# Patient Record
Sex: FEMALE | Race: WHITE | NOT HISPANIC OR LATINO | Employment: OTHER | URBAN - METROPOLITAN AREA
[De-identification: names, ages, dates, MRNs, and addresses within clinical notes are randomized per-mention and may not be internally consistent; named-entity substitution may affect disease eponyms.]

---

## 2023-04-02 PROBLEM — R73.09 ELEVATED GLUCOSE: Status: ACTIVE | Noted: 2023-04-02

## 2023-04-02 PROBLEM — R06.83 SNORING: Status: ACTIVE | Noted: 2023-04-02

## 2023-04-02 PROBLEM — N39.0 RECURRENT UTI: Status: ACTIVE | Noted: 2023-04-02

## 2023-04-02 PROBLEM — E66.01 OBESITY, CLASS III, BMI 40-49.9 (MORBID OBESITY) (MULTI): Status: ACTIVE | Noted: 2023-04-02

## 2023-04-02 PROBLEM — N18.31 CHRONIC KIDNEY DISEASE, STAGE 3A (MULTI): Status: ACTIVE | Noted: 2023-04-02

## 2023-04-02 PROBLEM — J30.9 ALLERGIC RHINITIS: Status: ACTIVE | Noted: 2023-04-02

## 2023-04-02 PROBLEM — R91.8 LUNG MASS: Status: ACTIVE | Noted: 2023-04-02

## 2023-04-02 PROBLEM — K22.5 ZENKER'S DIVERTICULUM: Status: ACTIVE | Noted: 2023-04-02

## 2023-04-02 PROBLEM — R53.81 PHYSICAL DECONDITIONING: Status: ACTIVE | Noted: 2023-04-02

## 2023-04-02 PROBLEM — I44.7 LEFT BUNDLE BRANCH BLOCK (LBBB): Status: ACTIVE | Noted: 2023-04-02

## 2023-04-02 PROBLEM — R53.81 MALAISE AND FATIGUE: Status: ACTIVE | Noted: 2023-04-02

## 2023-04-02 PROBLEM — E66.01 CLASS 3 SEVERE OBESITY DUE TO EXCESS CALORIES WITH BODY MASS INDEX (BMI) OF 40.0 TO 44.9 IN ADULT (MULTI): Status: ACTIVE | Noted: 2023-04-02

## 2023-04-02 PROBLEM — I51.7 CARDIOMEGALY: Status: ACTIVE | Noted: 2023-04-02

## 2023-04-02 PROBLEM — L65.9 HAIR LOSS: Status: ACTIVE | Noted: 2023-04-02

## 2023-04-02 PROBLEM — T21.14XA: Status: ACTIVE | Noted: 2023-04-02

## 2023-04-02 PROBLEM — R74.01 ELEVATED TRANSAMINASE LEVEL: Status: ACTIVE | Noted: 2023-04-02

## 2023-04-02 PROBLEM — R09.1 PLEURISY: Status: ACTIVE | Noted: 2023-04-02

## 2023-04-02 PROBLEM — M16.12 ARTHRITIS OF LEFT HIP: Status: ACTIVE | Noted: 2023-04-02

## 2023-04-02 PROBLEM — R53.83 FATIGUE: Status: ACTIVE | Noted: 2023-04-02

## 2023-04-02 PROBLEM — H91.93 HEARING LOSS OF BOTH EARS: Status: ACTIVE | Noted: 2023-04-02

## 2023-04-02 PROBLEM — I27.20 PULMONARY HYPERTENSION (MULTI): Status: ACTIVE | Noted: 2023-04-02

## 2023-04-02 PROBLEM — R30.0 DYSURIA: Status: ACTIVE | Noted: 2023-04-02

## 2023-04-02 PROBLEM — E73.9 LACTOSE INTOLERANCE: Status: ACTIVE | Noted: 2023-04-02

## 2023-04-02 PROBLEM — H11.32 SUBCONJUNCTIVAL HEMORRHAGE OF LEFT EYE: Status: ACTIVE | Noted: 2023-04-02

## 2023-04-02 PROBLEM — E78.5 HYPERLIPIDEMIA: Status: ACTIVE | Noted: 2023-04-02

## 2023-04-02 PROBLEM — N18.9 CKD (CHRONIC KIDNEY DISEASE): Status: ACTIVE | Noted: 2023-04-02

## 2023-04-02 PROBLEM — E03.9 HYPOTHYROIDISM (ACQUIRED): Status: ACTIVE | Noted: 2023-04-02

## 2023-04-02 PROBLEM — M79.642 HAND PAIN, LEFT: Status: ACTIVE | Noted: 2023-04-02

## 2023-04-02 PROBLEM — E55.9 VITAMIN D DEFICIENCY: Status: ACTIVE | Noted: 2023-04-02

## 2023-04-02 PROBLEM — N39.41 URGE INCONTINENCE OF URINE: Status: ACTIVE | Noted: 2023-04-02

## 2023-04-02 PROBLEM — I10 BENIGN ESSENTIAL HYPERTENSION: Status: ACTIVE | Noted: 2023-04-02

## 2023-04-02 PROBLEM — R13.12 OROPHARYNGEAL DYSPHAGIA: Status: ACTIVE | Noted: 2023-04-02

## 2023-04-02 PROBLEM — R06.02 SOB (SHORTNESS OF BREATH) ON EXERTION: Status: ACTIVE | Noted: 2023-04-02

## 2023-04-02 PROBLEM — S23.9XXA THORACIC SPRAIN: Status: ACTIVE | Noted: 2023-04-02

## 2023-04-02 PROBLEM — G47.33 OSA (OBSTRUCTIVE SLEEP APNEA): Status: ACTIVE | Noted: 2023-04-02

## 2023-04-02 PROBLEM — I49.9 ARRHYTHMIA: Status: ACTIVE | Noted: 2023-04-02

## 2023-04-02 PROBLEM — R53.83 MALAISE AND FATIGUE: Status: ACTIVE | Noted: 2023-04-02

## 2023-04-02 PROBLEM — R04.0 EPISTAXIS: Status: ACTIVE | Noted: 2023-04-02

## 2023-04-02 PROBLEM — R26.89 IMPAIRMENT OF BALANCE: Status: ACTIVE | Noted: 2023-04-02

## 2023-04-02 PROBLEM — I42.9 CARDIOMYOPATHY (MULTI): Status: ACTIVE | Noted: 2023-04-02

## 2023-04-02 PROBLEM — E11.21 DIABETIC NEPHROPATHY ASSOCIATED WITH TYPE 2 DIABETES MELLITUS (MULTI): Status: ACTIVE | Noted: 2023-04-02

## 2023-04-02 PROBLEM — M06.9 RHEUMATOID ARTHRITIS (MULTI): Status: ACTIVE | Noted: 2023-04-02

## 2023-04-02 PROBLEM — R26.81 GAIT INSTABILITY: Status: ACTIVE | Noted: 2023-04-02

## 2023-04-02 PROBLEM — R74.8 ELEVATED CREATINE KINASE: Status: ACTIVE | Noted: 2023-04-02

## 2023-04-02 PROBLEM — S33.5XXA LUMBAR SPRAIN: Status: ACTIVE | Noted: 2023-04-02

## 2023-04-02 PROBLEM — M25.552 PAIN OF LEFT HIP JOINT: Status: ACTIVE | Noted: 2023-04-02

## 2023-04-02 PROBLEM — E87.6 HYPOKALEMIA: Status: ACTIVE | Noted: 2023-04-02

## 2023-04-02 PROBLEM — D64.9 ANEMIA: Status: ACTIVE | Noted: 2023-04-02

## 2023-04-02 PROBLEM — R53.1 WEAKNESS: Status: ACTIVE | Noted: 2023-04-02

## 2023-04-02 PROBLEM — N18.30 STAGE 3 CHRONIC KIDNEY DISEASE (MULTI): Status: ACTIVE | Noted: 2023-04-02

## 2023-04-02 PROBLEM — H81.10 BPPV (BENIGN PAROXYSMAL POSITIONAL VERTIGO): Status: ACTIVE | Noted: 2023-04-02

## 2023-04-02 RX ORDER — TORSEMIDE 20 MG/1
1 TABLET ORAL 2 TIMES DAILY
COMMUNITY
Start: 2021-05-04 | End: 2023-05-08

## 2023-04-02 RX ORDER — OXYBUTYNIN CHLORIDE 15 MG/1
1 TABLET, EXTENDED RELEASE ORAL DAILY
COMMUNITY
Start: 2012-03-14 | End: 2023-05-08

## 2023-04-02 RX ORDER — OMEPRAZOLE 40 MG/1
1 CAPSULE, DELAYED RELEASE ORAL DAILY
COMMUNITY
Start: 2022-03-07

## 2023-04-02 RX ORDER — UPADACITINIB 15 MG/1
1 TABLET, EXTENDED RELEASE ORAL DAILY
COMMUNITY
Start: 2022-09-12 | End: 2023-11-27 | Stop reason: SDUPTHER

## 2023-04-02 RX ORDER — LOSARTAN POTASSIUM 25 MG/1
1 TABLET ORAL DAILY
COMMUNITY
Start: 2020-12-28 | End: 2023-05-02 | Stop reason: SDUPTHER

## 2023-04-02 RX ORDER — CEPHALEXIN 250 MG/1
1 CAPSULE ORAL DAILY
COMMUNITY
Start: 2020-04-08 | End: 2023-12-15

## 2023-05-02 DIAGNOSIS — I10 BENIGN ESSENTIAL HYPERTENSION: ICD-10-CM

## 2023-05-02 NOTE — TELEPHONE ENCOUNTER
Requested Prescriptions     Pending Prescriptions Disp Refills    losartan (Cozaar) 25 mg tablet 90 tablet 1     Sig: Take 1 tablet (25 mg) by mouth once daily.

## 2023-05-03 RX ORDER — LOSARTAN POTASSIUM 25 MG/1
25 TABLET ORAL DAILY
Qty: 90 TABLET | Refills: 1 | Status: SHIPPED | OUTPATIENT
Start: 2023-05-03 | End: 2023-10-18

## 2023-05-07 DIAGNOSIS — R06.02 SOB (SHORTNESS OF BREATH) ON EXERTION: Primary | ICD-10-CM

## 2023-05-07 DIAGNOSIS — N32.81 OAB (OVERACTIVE BLADDER): ICD-10-CM

## 2023-05-08 RX ORDER — OXYBUTYNIN CHLORIDE 15 MG/1
TABLET, EXTENDED RELEASE ORAL
Qty: 90 TABLET | Refills: 0 | Status: SHIPPED | OUTPATIENT
Start: 2023-05-08 | End: 2023-08-02

## 2023-05-08 RX ORDER — TORSEMIDE 20 MG/1
TABLET ORAL
Qty: 180 TABLET | Refills: 0 | Status: SHIPPED | OUTPATIENT
Start: 2023-05-08 | End: 2023-08-02

## 2023-06-19 ENCOUNTER — OFFICE VISIT (OUTPATIENT)
Dept: PRIMARY CARE | Facility: CLINIC | Age: 78
End: 2023-06-19
Payer: COMMERCIAL

## 2023-06-19 VITALS
HEART RATE: 74 BPM | OXYGEN SATURATION: 94 % | BODY MASS INDEX: 40.94 KG/M2 | DIASTOLIC BLOOD PRESSURE: 75 MMHG | HEIGHT: 56 IN | SYSTOLIC BLOOD PRESSURE: 130 MMHG | WEIGHT: 182 LBS

## 2023-06-19 DIAGNOSIS — E66.01 CLASS 3 SEVERE OBESITY DUE TO EXCESS CALORIES WITH SERIOUS COMORBIDITY AND BODY MASS INDEX (BMI) OF 40.0 TO 44.9 IN ADULT (MULTI): ICD-10-CM

## 2023-06-19 DIAGNOSIS — N18.31 CHRONIC KIDNEY DISEASE, STAGE 3A (MULTI): ICD-10-CM

## 2023-06-19 DIAGNOSIS — I10 BENIGN ESSENTIAL HYPERTENSION: ICD-10-CM

## 2023-06-19 DIAGNOSIS — M06.9 RHEUMATOID ARTHRITIS, INVOLVING UNSPECIFIED SITE, UNSPECIFIED WHETHER RHEUMATOID FACTOR PRESENT (MULTI): ICD-10-CM

## 2023-06-19 DIAGNOSIS — I27.20 PULMONARY HYPERTENSION (MULTI): ICD-10-CM

## 2023-06-19 DIAGNOSIS — E11.21 DIABETIC NEPHROPATHY ASSOCIATED WITH TYPE 2 DIABETES MELLITUS (MULTI): ICD-10-CM

## 2023-06-19 DIAGNOSIS — E55.9 VITAMIN D DEFICIENCY: ICD-10-CM

## 2023-06-19 DIAGNOSIS — Z00.00 ROUTINE GENERAL MEDICAL EXAMINATION AT HEALTH CARE FACILITY: Primary | ICD-10-CM

## 2023-06-19 DIAGNOSIS — E03.9 HYPOTHYROIDISM (ACQUIRED): ICD-10-CM

## 2023-06-19 PROCEDURE — 3078F DIAST BP <80 MM HG: CPT | Performed by: INTERNAL MEDICINE

## 2023-06-19 PROCEDURE — 1160F RVW MEDS BY RX/DR IN RCRD: CPT | Performed by: INTERNAL MEDICINE

## 2023-06-19 PROCEDURE — 1170F FXNL STATUS ASSESSED: CPT | Performed by: INTERNAL MEDICINE

## 2023-06-19 PROCEDURE — G0439 PPPS, SUBSEQ VISIT: HCPCS | Performed by: INTERNAL MEDICINE

## 2023-06-19 PROCEDURE — 3075F SYST BP GE 130 - 139MM HG: CPT | Performed by: INTERNAL MEDICINE

## 2023-06-19 PROCEDURE — 1036F TOBACCO NON-USER: CPT | Performed by: INTERNAL MEDICINE

## 2023-06-19 PROCEDURE — 99214 OFFICE O/P EST MOD 30 MIN: CPT | Performed by: INTERNAL MEDICINE

## 2023-06-19 PROCEDURE — 1159F MED LIST DOCD IN RCRD: CPT | Performed by: INTERNAL MEDICINE

## 2023-06-19 PROCEDURE — 1157F ADVNC CARE PLAN IN RCRD: CPT | Performed by: INTERNAL MEDICINE

## 2023-06-19 ASSESSMENT — PATIENT HEALTH QUESTIONNAIRE - PHQ9
2. FEELING DOWN, DEPRESSED OR HOPELESS: NOT AT ALL
1. LITTLE INTEREST OR PLEASURE IN DOING THINGS: NOT AT ALL
SUM OF ALL RESPONSES TO PHQ9 QUESTIONS 1 AND 2: 0

## 2023-06-19 ASSESSMENT — ACTIVITIES OF DAILY LIVING (ADL)
DOING_HOUSEWORK: INDEPENDENT
BATHING: INDEPENDENT
TAKING_MEDICATION: INDEPENDENT
MANAGING_FINANCES: INDEPENDENT
DRESSING: INDEPENDENT
GROCERY_SHOPPING: INDEPENDENT

## 2023-06-19 NOTE — ASSESSMENT & PLAN NOTE
Stable based on symptoms and exam. Follow established treatment plan. Follow up at least annually.

## 2023-06-19 NOTE — PROGRESS NOTES
"Subjective   Patient ID: Erick Muhammad is a 78 y.o. female who presents for Medicare Annual Wellness Visit Subsequent and Follow-up.    HPI     Overall doing well   No specific concerns  No CP or SOB  Has mild LLE edema, chronic issue.     Review of Systems    Objective   /75   Pulse 74   Ht 1.422 m (4' 8\")   Wt 82.6 kg (182 lb)   SpO2 94%   BMI 40.80 kg/m²     Physical Exam  Constitutional:       Appearance: Normal appearance.   HENT:      Head: Normocephalic and atraumatic.   Cardiovascular:      Rate and Rhythm: Normal rate and regular rhythm.      Heart sounds: Normal heart sounds. No murmur heard.     No gallop.   Pulmonary:      Effort: Pulmonary effort is normal. No respiratory distress.      Breath sounds: No wheezing or rales.   Musculoskeletal:      Left lower leg: Edema present.   Skin:     General: Skin is warm and dry.      Findings: No rash.   Neurological:      Mental Status: She is alert and oriented to person, place, and time. Mental status is at baseline.   Psychiatric:         Mood and Affect: Mood normal.         Behavior: Behavior normal.         Assessment/Plan       #Recurrent UTIs, OAB  -Follows with urology, Cont Keflex      #CKD3  -Stable, continue ARB, check Cr and lytes today      #Hypothyroidism   -Cont levothyroxine, check TSH today      #Anemia   -2/2 inflammatory anemia vs from CKD, check CBCD today      #HFrEF  -EF  has since improved to  55-60%. Follows with Dr. Webb. Cont ARB and torsemide,  -No BB due to h/o bradycardia      #RA  -Follows with Dr. Major. Cont Rinvoq     #LUCY, hypoxemia   -Patient denies the fact that she does have LUCY. Declines any need for CPaP and can not afford O2     #DM2  -no meds , check A1c today      #Vitamin D deficiency   -Continue supplementation, check vit D today        "

## 2023-07-05 DIAGNOSIS — I27.20 PULMONARY HYPERTENSION (MULTI): ICD-10-CM

## 2023-07-05 NOTE — PROGRESS NOTES
Requested Prescriptions     Pending Prescriptions Disp Refills    katrina.stocking,thigh,reg,med misc 2 each 0     Sig: Use daily for leg swelling

## 2023-07-30 DIAGNOSIS — R06.02 SOB (SHORTNESS OF BREATH) ON EXERTION: ICD-10-CM

## 2023-07-30 DIAGNOSIS — E03.9 HYPOTHYROIDISM, UNSPECIFIED TYPE: ICD-10-CM

## 2023-07-30 DIAGNOSIS — N32.81 OAB (OVERACTIVE BLADDER): ICD-10-CM

## 2023-08-02 RX ORDER — OXYBUTYNIN CHLORIDE 15 MG/1
TABLET, EXTENDED RELEASE ORAL
Qty: 90 TABLET | Refills: 1 | Status: SHIPPED | OUTPATIENT
Start: 2023-08-02 | End: 2024-01-03

## 2023-08-02 RX ORDER — TORSEMIDE 20 MG/1
TABLET ORAL
Qty: 180 TABLET | Refills: 1 | Status: SHIPPED | OUTPATIENT
Start: 2023-08-02 | End: 2023-10-27 | Stop reason: SDUPTHER

## 2023-08-02 RX ORDER — LEVOTHYROXINE SODIUM 112 UG/1
TABLET ORAL
Qty: 90 TABLET | Refills: 1 | Status: SHIPPED | OUTPATIENT
Start: 2023-08-02 | End: 2024-03-18

## 2023-10-18 DIAGNOSIS — I10 BENIGN ESSENTIAL HYPERTENSION: ICD-10-CM

## 2023-10-18 RX ORDER — LOSARTAN POTASSIUM 25 MG/1
25 TABLET ORAL DAILY
Qty: 90 TABLET | Refills: 2 | Status: SHIPPED | OUTPATIENT
Start: 2023-10-18

## 2023-10-18 NOTE — TELEPHONE ENCOUNTER
Requested Prescriptions     Pending Prescriptions Disp Refills    losartan (Cozaar) 25 mg tablet [Pharmacy Med Name: Losartan Potassium 25 MG Oral Tablet] 90 tablet 2     Sig: Take 1 tablet by mouth once daily

## 2023-10-27 ENCOUNTER — OFFICE VISIT (OUTPATIENT)
Dept: PRIMARY CARE | Facility: CLINIC | Age: 78
End: 2023-10-27
Payer: COMMERCIAL

## 2023-10-27 VITALS
SYSTOLIC BLOOD PRESSURE: 111 MMHG | OXYGEN SATURATION: 91 % | BODY MASS INDEX: 38.56 KG/M2 | DIASTOLIC BLOOD PRESSURE: 55 MMHG | HEART RATE: 79 BPM | WEIGHT: 172 LBS

## 2023-10-27 DIAGNOSIS — I87.2 CHRONIC VENOUS INSUFFICIENCY: Primary | ICD-10-CM

## 2023-10-27 PROCEDURE — 1160F RVW MEDS BY RX/DR IN RCRD: CPT | Performed by: INTERNAL MEDICINE

## 2023-10-27 PROCEDURE — 3074F SYST BP LT 130 MM HG: CPT | Performed by: INTERNAL MEDICINE

## 2023-10-27 PROCEDURE — 1159F MED LIST DOCD IN RCRD: CPT | Performed by: INTERNAL MEDICINE

## 2023-10-27 PROCEDURE — 3078F DIAST BP <80 MM HG: CPT | Performed by: INTERNAL MEDICINE

## 2023-10-27 PROCEDURE — 1036F TOBACCO NON-USER: CPT | Performed by: INTERNAL MEDICINE

## 2023-10-27 PROCEDURE — 99213 OFFICE O/P EST LOW 20 MIN: CPT | Performed by: INTERNAL MEDICINE

## 2023-10-27 RX ORDER — TORSEMIDE 20 MG/1
60 TABLET ORAL 2 TIMES DAILY
Qty: 270 TABLET | Refills: 1 | Status: SHIPPED | OUTPATIENT
Start: 2023-10-27 | End: 2024-01-22

## 2023-10-27 ASSESSMENT — ENCOUNTER SYMPTOMS
PALPITATIONS: 0
SHORTNESS OF BREATH: 0

## 2023-10-27 NOTE — PROGRESS NOTES
Subjective   Patient ID: Erick Muhammad is a 78 y.o. female who presents for Edema.    HPI   Patient states her feet have been swollen for past few months, left worse than right, with progressively worsening pain. The swelling is better first thing in the morning after waking up. Patient does not wear compression socks because she cannot afford good one and states that inexpensive compression socks have not previously worked. She recently saw her cardiologist who said she should speak to her primary doctor. Denies CP, SOB, LUIS, or other cardiac sx. She also reports multiple falls, most recently 3 weeks ago, and states she did hit her head She did not lose consciousness or experience bleeding or headaches, and she is not concerned about this.    Review of Systems   Respiratory:  Negative for shortness of breath.    Cardiovascular:  Positive for leg swelling. Negative for chest pain and palpitations.   All other systems reviewed and are negative.    Objective   /55   Pulse 79   Wt 78 kg (172 lb)   SpO2 91%   BMI 38.56 kg/m²     Physical Exam  Constitutional:       General: She is not in acute distress.     Appearance: Normal appearance.   Cardiovascular:      Rate and Rhythm: Normal rate and regular rhythm.      Heart sounds: Normal heart sounds.   Pulmonary:      Effort: Pulmonary effort is normal.      Breath sounds: Normal breath sounds.   Musculoskeletal:         General: No tenderness.      Right lower leg: Edema present.      Left lower leg: Edema present.   Skin:     General: Skin is warm and dry.   Neurological:      Mental Status: She is alert.       Assessment/Plan     #CVI  -Increased torsemide from 40mg to 60mg daily. Encouraged to try affordable compression socks.  -Elevated legs when able   -Instructed to complete labs in next 7-10 days  -Will send in walker Rx    Medical student note. Physician co-sign required.

## 2023-10-27 NOTE — PATIENT INSTRUCTIONS
Take torsemide 60mg daily, wear compression stockings if able to afford them, elevate legs when able   Get labs in the next 7-10 days   We will send in walker script

## 2023-10-30 DIAGNOSIS — E11.21 DIABETIC NEPHROPATHY ASSOCIATED WITH TYPE 2 DIABETES MELLITUS (MULTI): ICD-10-CM

## 2023-10-30 DIAGNOSIS — R26.81 GAIT INSTABILITY: ICD-10-CM

## 2023-10-30 DIAGNOSIS — M06.9 RHEUMATOID ARTHRITIS, INVOLVING UNSPECIFIED SITE, UNSPECIFIED WHETHER RHEUMATOID FACTOR PRESENT (MULTI): ICD-10-CM

## 2023-10-31 ENCOUNTER — LAB (OUTPATIENT)
Dept: LAB | Facility: LAB | Age: 78
End: 2023-10-31
Payer: COMMERCIAL

## 2023-10-31 DIAGNOSIS — E11.21 DIABETIC NEPHROPATHY ASSOCIATED WITH TYPE 2 DIABETES MELLITUS (MULTI): ICD-10-CM

## 2023-10-31 DIAGNOSIS — N18.31 CHRONIC KIDNEY DISEASE, STAGE 3A (MULTI): ICD-10-CM

## 2023-10-31 DIAGNOSIS — E03.9 HYPOTHYROIDISM (ACQUIRED): ICD-10-CM

## 2023-10-31 DIAGNOSIS — E55.9 VITAMIN D DEFICIENCY: ICD-10-CM

## 2023-10-31 LAB
ALBUMIN SERPL BCP-MCNC: 4 G/DL (ref 3.4–5)
ALP SERPL-CCNC: 107 U/L (ref 33–136)
ALT SERPL W P-5'-P-CCNC: 13 U/L (ref 7–45)
ANION GAP SERPL CALC-SCNC: 14 MMOL/L (ref 10–20)
AST SERPL W P-5'-P-CCNC: 15 U/L (ref 9–39)
BILIRUB SERPL-MCNC: 1.1 MG/DL (ref 0–1.2)
BUN SERPL-MCNC: 25 MG/DL (ref 6–23)
CALCIUM SERPL-MCNC: 9.5 MG/DL (ref 8.6–10.3)
CHLORIDE SERPL-SCNC: 94 MMOL/L (ref 98–107)
CHOLEST SERPL-MCNC: 150 MG/DL (ref 0–199)
CHOLESTEROL/HDL RATIO: 3.6
CO2 SERPL-SCNC: 35 MMOL/L (ref 21–32)
CREAT SERPL-MCNC: 1.29 MG/DL (ref 0.5–1.05)
ERYTHROCYTE [DISTWIDTH] IN BLOOD BY AUTOMATED COUNT: 15.5 % (ref 11.5–14.5)
GFR SERPL CREATININE-BSD FRML MDRD: 43 ML/MIN/1.73M*2
GLUCOSE SERPL-MCNC: 138 MG/DL (ref 74–99)
HCT VFR BLD AUTO: 36.6 % (ref 36–46)
HDLC SERPL-MCNC: 42 MG/DL
HGB BLD-MCNC: 11.9 G/DL (ref 12–16)
LDLC SERPL CALC-MCNC: 87 MG/DL
MCH RBC QN AUTO: 31.3 PG (ref 26–34)
MCHC RBC AUTO-ENTMCNC: 32.5 G/DL (ref 32–36)
MCV RBC AUTO: 96 FL (ref 80–100)
NON HDL CHOLESTEROL: 108 MG/DL (ref 0–149)
NRBC BLD-RTO: 0 /100 WBCS (ref 0–0)
PLATELET # BLD AUTO: 246 X10*3/UL (ref 150–450)
PMV BLD AUTO: 12.1 FL (ref 7.5–11.5)
POTASSIUM SERPL-SCNC: 3.5 MMOL/L (ref 3.5–5.3)
PROT SERPL-MCNC: 7.2 G/DL (ref 6.4–8.2)
RBC # BLD AUTO: 3.8 X10*6/UL (ref 4–5.2)
SODIUM SERPL-SCNC: 139 MMOL/L (ref 136–145)
TRIGL SERPL-MCNC: 105 MG/DL (ref 0–149)
TSH SERPL-ACNC: 0.57 MIU/L (ref 0.44–3.98)
VLDL: 21 MG/DL (ref 0–40)
WBC # BLD AUTO: 6.6 X10*3/UL (ref 4.4–11.3)

## 2023-10-31 PROCEDURE — 36415 COLL VENOUS BLD VENIPUNCTURE: CPT

## 2023-10-31 PROCEDURE — 84443 ASSAY THYROID STIM HORMONE: CPT

## 2023-10-31 PROCEDURE — 82306 VITAMIN D 25 HYDROXY: CPT

## 2023-10-31 PROCEDURE — 85027 COMPLETE CBC AUTOMATED: CPT

## 2023-10-31 PROCEDURE — 83036 HEMOGLOBIN GLYCOSYLATED A1C: CPT

## 2023-10-31 PROCEDURE — 80053 COMPREHEN METABOLIC PANEL: CPT

## 2023-10-31 PROCEDURE — 80061 LIPID PANEL: CPT

## 2023-11-01 LAB
25(OH)D3 SERPL-MCNC: 31 NG/ML (ref 30–100)
EST. AVERAGE GLUCOSE BLD GHB EST-MCNC: 117 MG/DL
HBA1C MFR BLD: 5.7 %

## 2023-11-03 ENCOUNTER — OFFICE VISIT (OUTPATIENT)
Dept: PRIMARY CARE | Facility: CLINIC | Age: 78
End: 2023-11-03
Payer: COMMERCIAL

## 2023-11-03 VITALS
SYSTOLIC BLOOD PRESSURE: 127 MMHG | WEIGHT: 168 LBS | BODY MASS INDEX: 37.66 KG/M2 | HEART RATE: 62 BPM | DIASTOLIC BLOOD PRESSURE: 69 MMHG | OXYGEN SATURATION: 92 %

## 2023-11-03 DIAGNOSIS — M79.674 PAIN OF TOE OF RIGHT FOOT: Primary | ICD-10-CM

## 2023-11-03 PROCEDURE — 3078F DIAST BP <80 MM HG: CPT | Performed by: INTERNAL MEDICINE

## 2023-11-03 PROCEDURE — 99213 OFFICE O/P EST LOW 20 MIN: CPT | Performed by: INTERNAL MEDICINE

## 2023-11-03 PROCEDURE — 1159F MED LIST DOCD IN RCRD: CPT | Performed by: INTERNAL MEDICINE

## 2023-11-03 PROCEDURE — 1160F RVW MEDS BY RX/DR IN RCRD: CPT | Performed by: INTERNAL MEDICINE

## 2023-11-03 PROCEDURE — 3074F SYST BP LT 130 MM HG: CPT | Performed by: INTERNAL MEDICINE

## 2023-11-03 PROCEDURE — 1036F TOBACCO NON-USER: CPT | Performed by: INTERNAL MEDICINE

## 2023-11-03 RX ORDER — DOXYCYCLINE 100 MG/1
100 CAPSULE ORAL 2 TIMES DAILY
Qty: 14 CAPSULE | Refills: 0 | Status: SHIPPED | OUTPATIENT
Start: 2023-11-03 | End: 2023-11-10

## 2023-11-03 RX ORDER — PREDNISONE 10 MG/1
10 TABLET ORAL SEE ADMIN INSTRUCTIONS
Qty: 30 TABLET | Refills: 0 | Status: SHIPPED | OUTPATIENT
Start: 2023-11-03 | End: 2024-02-15 | Stop reason: ALTCHOICE

## 2023-11-03 NOTE — PROGRESS NOTES
Subjective   Patient ID: Erick Muhammad is a 78 y.o. female who presents for Foot Pain.    HPI   Right second digit pain and redness x 2 days. It is hard to walk due to the pain . No fever or chills. Leg swelling has improved since our recent visit     Review of Systems    Objective   /69   Pulse 62   Wt 76.2 kg (168 lb)   SpO2 92%   BMI 37.66 kg/m²     Physical Exam  Musculoskeletal:      Right lower leg: No edema.      Left lower leg: No edema.      Comments: +compression stocking on left leg   +Redness, swelling and pain with palpation of left 2nd digit          Assessment/Plan     #Left toe pain   -Suspect gout vs RA flare, less likely cellulitis   -Rx prednisone taper and doxycycline 100mg BID x 7 days

## 2023-11-03 NOTE — PROGRESS NOTES
Patient reports RLE swelling and pain . Offered her appt today at 215 pm but unable to get her. Recommenced she go to ED for US of leg , she voiced understanding

## 2023-11-06 DIAGNOSIS — M06.9 RHEUMATOID ARTHRITIS, INVOLVING UNSPECIFIED SITE, UNSPECIFIED WHETHER RHEUMATOID FACTOR PRESENT (MULTI): Primary | ICD-10-CM

## 2023-11-27 ENCOUNTER — OFFICE VISIT (OUTPATIENT)
Dept: RHEUMATOLOGY | Facility: CLINIC | Age: 78
End: 2023-11-27
Payer: COMMERCIAL

## 2023-11-27 VITALS
WEIGHT: 167 LBS | HEART RATE: 76 BPM | SYSTOLIC BLOOD PRESSURE: 130 MMHG | DIASTOLIC BLOOD PRESSURE: 46 MMHG | HEIGHT: 56 IN | BODY MASS INDEX: 37.57 KG/M2

## 2023-11-27 DIAGNOSIS — M06.9 RHEUMATOID ARTHRITIS, INVOLVING UNSPECIFIED SITE, UNSPECIFIED WHETHER RHEUMATOID FACTOR PRESENT (MULTI): Primary | ICD-10-CM

## 2023-11-27 DIAGNOSIS — Z79.899 ENCOUNTER FOR LONG-TERM (CURRENT) USE OF MEDICATIONS: ICD-10-CM

## 2023-11-27 PROCEDURE — 99213 OFFICE O/P EST LOW 20 MIN: CPT | Performed by: INTERNAL MEDICINE

## 2023-11-27 PROCEDURE — 1036F TOBACCO NON-USER: CPT | Performed by: INTERNAL MEDICINE

## 2023-11-27 PROCEDURE — 1160F RVW MEDS BY RX/DR IN RCRD: CPT | Performed by: INTERNAL MEDICINE

## 2023-11-27 PROCEDURE — 3078F DIAST BP <80 MM HG: CPT | Performed by: INTERNAL MEDICINE

## 2023-11-27 PROCEDURE — 3075F SYST BP GE 130 - 139MM HG: CPT | Performed by: INTERNAL MEDICINE

## 2023-11-27 PROCEDURE — 1159F MED LIST DOCD IN RCRD: CPT | Performed by: INTERNAL MEDICINE

## 2023-11-27 RX ORDER — VIT C/E/ZN/COPPR/LUTEIN/ZEAXAN 250MG-90MG
25 CAPSULE ORAL DAILY
COMMUNITY

## 2023-11-27 ASSESSMENT — ENCOUNTER SYMPTOMS
CONSTIPATION: 0
FATIGUE: 1
SHORTNESS OF BREATH: 0
NECK PAIN: 1
DIARRHEA: 0

## 2023-11-27 NOTE — PROGRESS NOTES
Subjective   Patient ID: Erick Muhammad is a 78 y.o. female who presents for Rheumatoid Arthritis (Follow up~ LOV: 10/7/21/Back of neck is very painful for patient).  HPI  Patient with history of rheumatoid arthritis and osteoarthritis.  Previous medications have included Humira, methotrexate, Orencia, Xeljanz.    We have not seen her since October 2021.  She has been on Rinvoq hrough patient assistance.    She has had a couple of falls  She had been dizzy for quite a while.  The dizziness caused     Has retained a lot of fluid in his left leg.  Her diuretic was increased.    Left toe swollen    Her right wrist has been starting to hurt but she feels it is from crocheting.  Her neck has been hurting and again she feels that it is from the positioning that she is doing from crocheting.    Denies any infections.  She does feel that Rinvoq is much better than Xeljanz.  She does not have as much pain.  Does not feel that she has any side effects to the medication.    Review of Systems   Constitutional:  Positive for fatigue.   Respiratory:  Negative for shortness of breath.    Cardiovascular:  Negative for chest pain.   Gastrointestinal:  Negative for constipation and diarrhea.   Musculoskeletal:  Positive for neck pain.        Right wrist   Neurological:         Dizzy only when lay flat       Objective   Physical Exam  GEN: NAD A&O x3 appropriate affect  HEENT:wears glasses.  LYMPH: no cervical lymphadenopathy  SKIN: no rashes  PULSES: +radials  TENDER POINTS: 10/18   MSK: compression socks   No current tenderness in the DIP PIP MCP.  Has ulnar deviation and mild swan-neck in the right hand  Some mild fullness in the right wrist compared to the left  decrease ext in c spine  Bilateral knee replacements.    ankles mild fullness    Assessment/Plan        Rheumatoid arthritis.  Previously has been on Humira, methotrexate, Xeljanz, Orencia.     -Currently on Rinvoq and does have improvement in her symptoms we will have her  do blood work.  Currently gets her medication through patient assistance.

## 2023-12-01 ENCOUNTER — LAB (OUTPATIENT)
Dept: LAB | Facility: LAB | Age: 78
End: 2023-12-01
Payer: COMMERCIAL

## 2023-12-01 DIAGNOSIS — M06.9 RHEUMATOID ARTHRITIS, INVOLVING UNSPECIFIED SITE, UNSPECIFIED WHETHER RHEUMATOID FACTOR PRESENT (MULTI): ICD-10-CM

## 2023-12-01 LAB
CRP SERPL-MCNC: 0.21 MG/DL
ERYTHROCYTE [SEDIMENTATION RATE] IN BLOOD BY WESTERGREN METHOD: 9 MM/H (ref 0–30)
URATE SERPL-MCNC: 10.4 MG/DL (ref 2.3–6.7)

## 2023-12-01 PROCEDURE — 86140 C-REACTIVE PROTEIN: CPT

## 2023-12-01 PROCEDURE — 86481 TB AG RESPONSE T-CELL SUSP: CPT

## 2023-12-01 PROCEDURE — 36415 COLL VENOUS BLD VENIPUNCTURE: CPT

## 2023-12-01 PROCEDURE — 85652 RBC SED RATE AUTOMATED: CPT

## 2023-12-01 PROCEDURE — 84550 ASSAY OF BLOOD/URIC ACID: CPT

## 2023-12-03 LAB
NIL(NEG) CONTROL SPOT COUNT: NORMAL
PANEL A SPOT COUNT: 0
PANEL B SPOT COUNT: 0
POS CONTROL SPOT COUNT: NORMAL
T-SPOT. TB INTERPRETATION: NEGATIVE

## 2023-12-07 ENCOUNTER — TELEPHONE (OUTPATIENT)
Dept: RHEUMATOLOGY | Facility: CLINIC | Age: 78
End: 2023-12-07
Payer: COMMERCIAL

## 2023-12-07 DIAGNOSIS — M10.00 IDIOPATHIC GOUT, UNSPECIFIED CHRONICITY, UNSPECIFIED SITE: Primary | ICD-10-CM

## 2023-12-07 DIAGNOSIS — M10.00 IDIOPATHIC GOUT, UNSPECIFIED CHRONICITY, UNSPECIFIED SITE: ICD-10-CM

## 2023-12-07 DIAGNOSIS — M06.9 RHEUMATOID ARTHRITIS, INVOLVING UNSPECIFIED SITE, UNSPECIFIED WHETHER RHEUMATOID FACTOR PRESENT (MULTI): Primary | ICD-10-CM

## 2023-12-07 RX ORDER — ALLOPURINOL 100 MG/1
100 TABLET ORAL DAILY
Qty: 30 TABLET | Refills: 5 | Status: SHIPPED | OUTPATIENT
Start: 2023-12-07 | End: 2023-12-07 | Stop reason: SDUPTHER

## 2023-12-07 RX ORDER — COLCHICINE 0.6 MG/1
0.6 TABLET ORAL DAILY
Qty: 30 TABLET | Refills: 1 | Status: SHIPPED | OUTPATIENT
Start: 2023-12-07 | End: 2023-12-07 | Stop reason: SDUPTHER

## 2023-12-07 RX ORDER — COLCHICINE 0.6 MG/1
0.6 TABLET ORAL DAILY
Qty: 30 TABLET | Refills: 1 | Status: SHIPPED | OUTPATIENT
Start: 2023-12-07 | End: 2024-01-18

## 2023-12-07 RX ORDER — ALLOPURINOL 100 MG/1
100 TABLET ORAL DAILY
Qty: 30 TABLET | Refills: 5 | Status: SHIPPED | OUTPATIENT
Start: 2023-12-07 | End: 2024-02-21 | Stop reason: SDUPTHER

## 2023-12-07 NOTE — TELEPHONE ENCOUNTER
I sent a message on [employer] and she responded last evening 12/06 at 6pm.  Just getting to it right now of sending allopurinol.  Will message her in [employer]

## 2023-12-08 DIAGNOSIS — M06.9 RHEUMATOID ARTHRITIS, INVOLVING UNSPECIFIED SITE, UNSPECIFIED WHETHER RHEUMATOID FACTOR PRESENT (MULTI): ICD-10-CM

## 2023-12-14 DIAGNOSIS — N39.0 RECURRENT URINARY TRACT INFECTION: Primary | ICD-10-CM

## 2023-12-15 RX ORDER — CEPHALEXIN 250 MG/1
250 CAPSULE ORAL DAILY
Qty: 90 CAPSULE | Refills: 0 | Status: SHIPPED | OUTPATIENT
Start: 2023-12-15 | End: 2024-03-15 | Stop reason: SDUPTHER

## 2023-12-20 ENCOUNTER — APPOINTMENT (OUTPATIENT)
Dept: PRIMARY CARE | Facility: CLINIC | Age: 78
End: 2023-12-20
Payer: COMMERCIAL

## 2024-01-03 DIAGNOSIS — N32.81 OAB (OVERACTIVE BLADDER): ICD-10-CM

## 2024-01-03 RX ORDER — OXYBUTYNIN CHLORIDE 15 MG/1
TABLET, EXTENDED RELEASE ORAL
Qty: 90 TABLET | Refills: 0 | Status: SHIPPED | OUTPATIENT
Start: 2024-01-03 | End: 2024-04-02

## 2024-01-18 DIAGNOSIS — M10.00 IDIOPATHIC GOUT, UNSPECIFIED CHRONICITY, UNSPECIFIED SITE: Primary | ICD-10-CM

## 2024-01-18 RX ORDER — COLCHICINE 0.6 MG/1
0.6 CAPSULE ORAL DAILY
Qty: 30 CAPSULE | Refills: 11 | Status: SHIPPED | OUTPATIENT
Start: 2024-01-18 | End: 2025-01-17

## 2024-01-20 DIAGNOSIS — I87.2 CHRONIC VENOUS INSUFFICIENCY: ICD-10-CM

## 2024-01-22 RX ORDER — TORSEMIDE 20 MG/1
20 TABLET ORAL 2 TIMES DAILY
Qty: 180 TABLET | Refills: 0 | Status: SHIPPED | OUTPATIENT
Start: 2024-01-22 | End: 2024-02-15 | Stop reason: SDUPTHER

## 2024-02-13 NOTE — PROGRESS NOTES
Subjective   Patient ID: Erick Muhammad is a 78 y.o. female who presents for Medicare Annual Wellness Visit Subsequent.    HPI   Overall doing well   Had matted eyes shut the few days but nearly resolved. No redness or change in vision     Review of Systems    Objective   There were no vitals taken for this visit.    Physical Exam    Assessment/Plan        #Recurrent UTIs, OAB  -Follows with urology, Cont Keflex      #CKD3  -Stable, continue ARB, check Cr and lytes today . CBC and CMP prior to next appt      #Hypothyroidism   -Cont levothyroxine, TSH prior to next appt      #Anemia   -2/2 inflammatory anemia vs from CKD  -CBCD prior to next appt      #HFrEF  -EF  has since improved to  55-60%. Follows with Dr. Webb. Cont ARB and torsemide,  -No BB due to h/o bradycardia      #RA and gout   -Follows with Dr. Major. Cont Rinvoq and allouprinol      #LUCY, hypoxemia   -Patient denies the fact that she does have LUCY. Declines any need for CPaP and can not afford O2     #DM2  -no meds , check A1c prior to next appt      #Vitamin D deficiency   -Continue supplementation

## 2024-02-15 ENCOUNTER — LAB (OUTPATIENT)
Dept: LAB | Facility: LAB | Age: 79
End: 2024-02-15
Payer: COMMERCIAL

## 2024-02-15 ENCOUNTER — OFFICE VISIT (OUTPATIENT)
Dept: PRIMARY CARE | Facility: CLINIC | Age: 79
End: 2024-02-15
Payer: COMMERCIAL

## 2024-02-15 VITALS
HEART RATE: 54 BPM | WEIGHT: 164 LBS | DIASTOLIC BLOOD PRESSURE: 71 MMHG | HEIGHT: 56 IN | BODY MASS INDEX: 36.89 KG/M2 | OXYGEN SATURATION: 92 % | SYSTOLIC BLOOD PRESSURE: 112 MMHG

## 2024-02-15 DIAGNOSIS — E78.5 HYPERLIPIDEMIA, UNSPECIFIED HYPERLIPIDEMIA TYPE: ICD-10-CM

## 2024-02-15 DIAGNOSIS — M10.00 IDIOPATHIC GOUT, UNSPECIFIED CHRONICITY, UNSPECIFIED SITE: ICD-10-CM

## 2024-02-15 DIAGNOSIS — R73.09 ELEVATED GLUCOSE: ICD-10-CM

## 2024-02-15 DIAGNOSIS — I10 BENIGN ESSENTIAL HYPERTENSION: ICD-10-CM

## 2024-02-15 DIAGNOSIS — N18.31 CHRONIC KIDNEY DISEASE, STAGE 3A (MULTI): ICD-10-CM

## 2024-02-15 DIAGNOSIS — I87.2 CHRONIC VENOUS INSUFFICIENCY: ICD-10-CM

## 2024-02-15 DIAGNOSIS — Z00.00 ROUTINE GENERAL MEDICAL EXAMINATION AT HEALTH CARE FACILITY: Primary | ICD-10-CM

## 2024-02-15 DIAGNOSIS — I27.20 PULMONARY HYPERTENSION (MULTI): ICD-10-CM

## 2024-02-15 DIAGNOSIS — E03.9 HYPOTHYROIDISM (ACQUIRED): ICD-10-CM

## 2024-02-15 DIAGNOSIS — E11.21 DIABETIC NEPHROPATHY ASSOCIATED WITH TYPE 2 DIABETES MELLITUS (MULTI): ICD-10-CM

## 2024-02-15 DIAGNOSIS — M06.9 RHEUMATOID ARTHRITIS, INVOLVING UNSPECIFIED SITE, UNSPECIFIED WHETHER RHEUMATOID FACTOR PRESENT (MULTI): ICD-10-CM

## 2024-02-15 PROBLEM — E66.01 OBESITY, CLASS III, BMI 40-49.9 (MORBID OBESITY) (MULTI): Status: RESOLVED | Noted: 2023-04-02 | Resolved: 2024-02-15

## 2024-02-15 PROBLEM — E66.01 CLASS 3 SEVERE OBESITY DUE TO EXCESS CALORIES WITH BODY MASS INDEX (BMI) OF 40.0 TO 44.9 IN ADULT (MULTI): Status: RESOLVED | Noted: 2023-04-02 | Resolved: 2024-02-15

## 2024-02-15 LAB — URATE SERPL-MCNC: 7.6 MG/DL (ref 2.3–6.7)

## 2024-02-15 PROCEDURE — 1159F MED LIST DOCD IN RCRD: CPT | Performed by: INTERNAL MEDICINE

## 2024-02-15 PROCEDURE — G0439 PPPS, SUBSEQ VISIT: HCPCS | Performed by: INTERNAL MEDICINE

## 2024-02-15 PROCEDURE — 1170F FXNL STATUS ASSESSED: CPT | Performed by: INTERNAL MEDICINE

## 2024-02-15 PROCEDURE — 99213 OFFICE O/P EST LOW 20 MIN: CPT | Performed by: INTERNAL MEDICINE

## 2024-02-15 PROCEDURE — 1036F TOBACCO NON-USER: CPT | Performed by: INTERNAL MEDICINE

## 2024-02-15 PROCEDURE — 1157F ADVNC CARE PLAN IN RCRD: CPT | Performed by: INTERNAL MEDICINE

## 2024-02-15 PROCEDURE — 84550 ASSAY OF BLOOD/URIC ACID: CPT

## 2024-02-15 PROCEDURE — 36415 COLL VENOUS BLD VENIPUNCTURE: CPT

## 2024-02-15 PROCEDURE — 3074F SYST BP LT 130 MM HG: CPT | Performed by: INTERNAL MEDICINE

## 2024-02-15 PROCEDURE — 3078F DIAST BP <80 MM HG: CPT | Performed by: INTERNAL MEDICINE

## 2024-02-15 PROCEDURE — 1160F RVW MEDS BY RX/DR IN RCRD: CPT | Performed by: INTERNAL MEDICINE

## 2024-02-15 RX ORDER — TORSEMIDE 20 MG/1
60 TABLET ORAL DAILY
Qty: 180 TABLET | Refills: 0 | Status: SHIPPED | OUTPATIENT
Start: 2024-02-15 | End: 2024-06-10

## 2024-02-15 ASSESSMENT — ACTIVITIES OF DAILY LIVING (ADL)
MANAGING_FINANCES: INDEPENDENT
BATHING: INDEPENDENT
TAKING_MEDICATION: INDEPENDENT
GROCERY_SHOPPING: INDEPENDENT
DOING_HOUSEWORK: INDEPENDENT
DRESSING: INDEPENDENT

## 2024-02-15 ASSESSMENT — PATIENT HEALTH QUESTIONNAIRE - PHQ9
SUM OF ALL RESPONSES TO PHQ9 QUESTIONS 1 AND 2: 0
1. LITTLE INTEREST OR PLEASURE IN DOING THINGS: NOT AT ALL
2. FEELING DOWN, DEPRESSED OR HOPELESS: NOT AT ALL

## 2024-02-15 NOTE — PATIENT INSTRUCTIONS
Dr. Kamala Miles  [phone]  Please get uric acid checked soon   Hold off on my labs for about 4-6 months     Come see me in 4-6 months

## 2024-02-21 DIAGNOSIS — M10.00 IDIOPATHIC GOUT, UNSPECIFIED CHRONICITY, UNSPECIFIED SITE: ICD-10-CM

## 2024-02-21 RX ORDER — ALLOPURINOL 100 MG/1
200 TABLET ORAL DAILY
Qty: 60 TABLET | Refills: 5 | Status: SHIPPED | OUTPATIENT
Start: 2024-02-21 | End: 2024-08-19

## 2024-03-15 DIAGNOSIS — N39.0 RECURRENT URINARY TRACT INFECTION: ICD-10-CM

## 2024-03-15 DIAGNOSIS — E03.9 HYPOTHYROIDISM, UNSPECIFIED TYPE: ICD-10-CM

## 2024-03-15 RX ORDER — CEPHALEXIN 250 MG/1
250 CAPSULE ORAL DAILY
Qty: 90 CAPSULE | Refills: 0 | OUTPATIENT
Start: 2024-03-15

## 2024-03-18 RX ORDER — LEVOTHYROXINE SODIUM 112 UG/1
TABLET ORAL
Qty: 90 TABLET | Refills: 0 | Status: SHIPPED | OUTPATIENT
Start: 2024-03-18 | End: 2024-06-10

## 2024-03-18 RX ORDER — CEPHALEXIN 250 MG/1
250 CAPSULE ORAL DAILY
Qty: 90 CAPSULE | Refills: 0 | Status: SHIPPED | OUTPATIENT
Start: 2024-03-18 | End: 2024-06-10

## 2024-04-02 DIAGNOSIS — N32.81 OAB (OVERACTIVE BLADDER): ICD-10-CM

## 2024-04-02 RX ORDER — OXYBUTYNIN CHLORIDE 15 MG/1
TABLET, EXTENDED RELEASE ORAL
Qty: 90 TABLET | Refills: 0 | Status: SHIPPED | OUTPATIENT
Start: 2024-04-02

## 2024-06-09 DIAGNOSIS — I87.2 CHRONIC VENOUS INSUFFICIENCY: ICD-10-CM

## 2024-06-09 DIAGNOSIS — N39.0 RECURRENT URINARY TRACT INFECTION: ICD-10-CM

## 2024-06-09 DIAGNOSIS — E03.9 HYPOTHYROIDISM, UNSPECIFIED TYPE: ICD-10-CM

## 2024-06-10 RX ORDER — LEVOTHYROXINE SODIUM 112 UG/1
TABLET ORAL
Qty: 90 TABLET | Refills: 0 | Status: SHIPPED | OUTPATIENT
Start: 2024-06-10

## 2024-06-10 RX ORDER — TORSEMIDE 20 MG/1
60 TABLET ORAL 2 TIMES DAILY
Qty: 270 TABLET | Refills: 0 | Status: SHIPPED | OUTPATIENT
Start: 2024-06-10

## 2024-06-10 RX ORDER — CEPHALEXIN 250 MG/1
250 CAPSULE ORAL DAILY
Qty: 90 CAPSULE | Refills: 0 | Status: SHIPPED | OUTPATIENT
Start: 2024-06-10

## 2024-06-13 ENCOUNTER — HOSPITAL ENCOUNTER (EMERGENCY)
Facility: HOSPITAL | Age: 79
Discharge: HOME | End: 2024-06-13
Payer: COMMERCIAL

## 2024-06-13 ENCOUNTER — APPOINTMENT (OUTPATIENT)
Dept: CARDIOLOGY | Facility: HOSPITAL | Age: 79
End: 2024-06-13
Payer: COMMERCIAL

## 2024-06-13 ENCOUNTER — APPOINTMENT (OUTPATIENT)
Dept: RADIOLOGY | Facility: HOSPITAL | Age: 79
End: 2024-06-13
Payer: COMMERCIAL

## 2024-06-13 VITALS
SYSTOLIC BLOOD PRESSURE: 103 MMHG | HEART RATE: 84 BPM | BODY MASS INDEX: 37.12 KG/M2 | WEIGHT: 165 LBS | HEIGHT: 56 IN | TEMPERATURE: 97 F | OXYGEN SATURATION: 96 % | RESPIRATION RATE: 18 BRPM | DIASTOLIC BLOOD PRESSURE: 71 MMHG

## 2024-06-13 DIAGNOSIS — R06.02 SHORTNESS OF BREATH: Primary | ICD-10-CM

## 2024-06-13 LAB
ALBUMIN SERPL BCP-MCNC: 3.7 G/DL (ref 3.4–5)
ALP SERPL-CCNC: 82 U/L (ref 33–136)
ALT SERPL W P-5'-P-CCNC: 11 U/L (ref 7–45)
ANION GAP SERPL CALC-SCNC: 11 MMOL/L (ref 10–20)
AST SERPL W P-5'-P-CCNC: 17 U/L (ref 9–39)
BASOPHILS # BLD AUTO: 0.04 X10*3/UL (ref 0–0.1)
BASOPHILS NFR BLD AUTO: 0.5 %
BILIRUB SERPL-MCNC: 1.4 MG/DL (ref 0–1.2)
BNP SERPL-MCNC: 735 PG/ML (ref 0–99)
BUN SERPL-MCNC: 20 MG/DL (ref 6–23)
CALCIUM SERPL-MCNC: 9.1 MG/DL (ref 8.6–10.3)
CARDIAC TROPONIN I PNL SERPL HS: 27 NG/L (ref 0–13)
CARDIAC TROPONIN I PNL SERPL HS: 29 NG/L (ref 0–13)
CHLORIDE SERPL-SCNC: 96 MMOL/L (ref 98–107)
CO2 SERPL-SCNC: 34 MMOL/L (ref 21–32)
CREAT SERPL-MCNC: 1.33 MG/DL (ref 0.5–1.05)
EGFRCR SERPLBLD CKD-EPI 2021: 41 ML/MIN/1.73M*2
EOSINOPHIL # BLD AUTO: 0.18 X10*3/UL (ref 0–0.4)
EOSINOPHIL NFR BLD AUTO: 2.3 %
ERYTHROCYTE [DISTWIDTH] IN BLOOD BY AUTOMATED COUNT: 16.6 % (ref 11.5–14.5)
GLUCOSE SERPL-MCNC: 136 MG/DL (ref 74–99)
HCT VFR BLD AUTO: 31.3 % (ref 36–46)
HGB BLD-MCNC: 10.1 G/DL (ref 12–16)
IMM GRANULOCYTES # BLD AUTO: 0.05 X10*3/UL (ref 0–0.5)
IMM GRANULOCYTES NFR BLD AUTO: 0.6 % (ref 0–0.9)
LYMPHOCYTES # BLD AUTO: 0.71 X10*3/UL (ref 0.8–3)
LYMPHOCYTES NFR BLD AUTO: 9.1 %
MCH RBC QN AUTO: 31.3 PG (ref 26–34)
MCHC RBC AUTO-ENTMCNC: 32.3 G/DL (ref 32–36)
MCV RBC AUTO: 97 FL (ref 80–100)
MONOCYTES # BLD AUTO: 0.6 X10*3/UL (ref 0.05–0.8)
MONOCYTES NFR BLD AUTO: 7.7 %
NEUTROPHILS # BLD AUTO: 6.19 X10*3/UL (ref 1.6–5.5)
NEUTROPHILS NFR BLD AUTO: 79.8 %
NRBC BLD-RTO: 0 /100 WBCS (ref 0–0)
PLATELET # BLD AUTO: 199 X10*3/UL (ref 150–450)
POTASSIUM SERPL-SCNC: 3.8 MMOL/L (ref 3.5–5.3)
PROT SERPL-MCNC: 6 G/DL (ref 6.4–8.2)
RBC # BLD AUTO: 3.23 X10*6/UL (ref 4–5.2)
SODIUM SERPL-SCNC: 137 MMOL/L (ref 136–145)
WBC # BLD AUTO: 7.8 X10*3/UL (ref 4.4–11.3)

## 2024-06-13 PROCEDURE — 84075 ASSAY ALKALINE PHOSPHATASE: CPT | Performed by: PHYSICIAN ASSISTANT

## 2024-06-13 PROCEDURE — 2500000004 HC RX 250 GENERAL PHARMACY W/ HCPCS (ALT 636 FOR OP/ED): Performed by: PHYSICIAN ASSISTANT

## 2024-06-13 PROCEDURE — 84484 ASSAY OF TROPONIN QUANT: CPT | Mod: 91 | Performed by: PHYSICIAN ASSISTANT

## 2024-06-13 PROCEDURE — 36415 COLL VENOUS BLD VENIPUNCTURE: CPT | Performed by: PHYSICIAN ASSISTANT

## 2024-06-13 PROCEDURE — 71045 X-RAY EXAM CHEST 1 VIEW: CPT | Performed by: RADIOLOGY

## 2024-06-13 PROCEDURE — 93971 EXTREMITY STUDY: CPT | Performed by: RADIOLOGY

## 2024-06-13 PROCEDURE — 93971 EXTREMITY STUDY: CPT

## 2024-06-13 PROCEDURE — 83880 ASSAY OF NATRIURETIC PEPTIDE: CPT | Performed by: PHYSICIAN ASSISTANT

## 2024-06-13 PROCEDURE — 93005 ELECTROCARDIOGRAM TRACING: CPT

## 2024-06-13 PROCEDURE — 99285 EMERGENCY DEPT VISIT HI MDM: CPT | Mod: 25

## 2024-06-13 PROCEDURE — 96374 THER/PROPH/DIAG INJ IV PUSH: CPT

## 2024-06-13 PROCEDURE — 85025 COMPLETE CBC W/AUTO DIFF WBC: CPT | Performed by: PHYSICIAN ASSISTANT

## 2024-06-13 PROCEDURE — 71045 X-RAY EXAM CHEST 1 VIEW: CPT

## 2024-06-13 PROCEDURE — 73610 X-RAY EXAM OF ANKLE: CPT | Mod: LT

## 2024-06-13 PROCEDURE — 73610 X-RAY EXAM OF ANKLE: CPT | Mod: LEFT SIDE | Performed by: RADIOLOGY

## 2024-06-13 PROCEDURE — 84484 ASSAY OF TROPONIN QUANT: CPT | Performed by: PHYSICIAN ASSISTANT

## 2024-06-13 RX ORDER — FUROSEMIDE 10 MG/ML
60 INJECTION INTRAMUSCULAR; INTRAVENOUS ONCE
Status: COMPLETED | OUTPATIENT
Start: 2024-06-13 | End: 2024-06-13

## 2024-06-13 RX ADMIN — FUROSEMIDE 60 MG: 10 INJECTION, SOLUTION INTRAMUSCULAR; INTRAVENOUS at 18:25

## 2024-06-13 ASSESSMENT — LIFESTYLE VARIABLES
HAVE PEOPLE ANNOYED YOU BY CRITICIZING YOUR DRINKING: NO
EVER FELT BAD OR GUILTY ABOUT YOUR DRINKING: NO
HAVE YOU EVER FELT YOU SHOULD CUT DOWN ON YOUR DRINKING: NO
TOTAL SCORE: 0
EVER HAD A DRINK FIRST THING IN THE MORNING TO STEADY YOUR NERVES TO GET RID OF A HANGOVER: NO

## 2024-06-13 ASSESSMENT — PAIN DESCRIPTION - LOCATION: LOCATION: ANKLE

## 2024-06-13 ASSESSMENT — COLUMBIA-SUICIDE SEVERITY RATING SCALE - C-SSRS
2. HAVE YOU ACTUALLY HAD ANY THOUGHTS OF KILLING YOURSELF?: NO
1. IN THE PAST MONTH, HAVE YOU WISHED YOU WERE DEAD OR WISHED YOU COULD GO TO SLEEP AND NOT WAKE UP?: NO
6. HAVE YOU EVER DONE ANYTHING, STARTED TO DO ANYTHING, OR PREPARED TO DO ANYTHING TO END YOUR LIFE?: NO

## 2024-06-13 ASSESSMENT — PAIN DESCRIPTION - ORIENTATION: ORIENTATION: LEFT

## 2024-06-13 ASSESSMENT — PAIN DESCRIPTION - PAIN TYPE: TYPE: ACUTE PAIN

## 2024-06-13 ASSESSMENT — PAIN - FUNCTIONAL ASSESSMENT: PAIN_FUNCTIONAL_ASSESSMENT: 0-10

## 2024-06-13 ASSESSMENT — PAIN SCALES - GENERAL: PAINLEVEL_OUTOF10: 2

## 2024-06-13 NOTE — ED PROVIDER NOTES
HPI   Chief Complaint   Patient presents with    Leg Swelling       This is a 79-year-old female coming for left lower extremity pain but also reports shortness of breath.  She states that the symptoms have been occurring for multiple months.  Patient states that she has a history of congestive heart failure and takes torsemide 60 mg twice a day.  She has had lower extremity edema bilaterally however left worse than right.  The left leg hurts when she ambulates and states that she has had the symptoms before when she has had CHF exacerbations.  She has had shortness of breath again for the last 3 months.  There are no acute changes to it.  She denies any chest pain associated with this.  She has not any vomiting or diarrhea.  No fevers or chills.  Sister arrived at bedside and again patient is adamant that the symptoms are not acute however have been occurring over the last few months      History provided by:  Patient                      [place] Coma Scale Score: 15                     Patient History   Past Medical History:   Diagnosis Date    Achilles tendinitis, unspecified leg 05/19/2014    Achilles tendinitis    Acute kidney failure, unspecified (CMS-[facility]) 11/20/2020    REN (acute kidney injury)    Benign paroxysmal vertigo, unspecified ear 01/04/2017    BPPV (benign paroxysmal positional vertigo)    Bitten or stung by nonvenomous insect and other nonvenomous arthropods, initial encounter 07/23/2019    Insect bite, unspecified site, initial encounter    Cellulitis of face 01/17/2017    Cellulitis and abscess of face    Chronic systolic (congestive) heart failure (Multi) 09/11/2021    Chronic systolic congestive heart failure    Diarrhea, unspecified 01/04/2019    Acute diarrhea    Impacted cerumen, bilateral 04/24/2019    Impacted cerumen of both ears    Lesion of ulnar nerve, unspecified upper limb 05/19/2014    Cubital tunnel syndrome    Localized edema 07/29/2019    Edema of both legs    Other acute  sinusitis 07/03/2019    Other acute sinusitis, recurrence not specified    Other conditions influencing health status     Osteoarthritis    Other microscopic hematuria 06/01/2017    Hematuria, microscopic    Other specified symptoms and signs involving the circulatory and respiratory systems 12/15/2021    Choking episode    Personal history of diseases of the blood and blood-forming organs and certain disorders involving the immune mechanism 01/24/2020    History of thrombocytopenia    Personal history of diseases of the blood and blood-forming organs and certain disorders involving the immune mechanism     History of iron deficiency anemia    Personal history of other diseases of the circulatory system 03/31/2020    History of intermittent claudication    Personal history of other diseases of the circulatory system     History of hypertension    Personal history of other diseases of the respiratory system 03/27/2017    History of acute sinusitis    Personal history of other endocrine, nutritional and metabolic disease     History of hypothyroidism    Personal history of other specified conditions 12/22/2021    History of hoarseness    Personal history of other specified conditions 07/23/2019    History of dysuria    Personal history of pneumonia (recurrent) 02/10/2020    History of community acquired pneumonia    Personal history of urinary (tract) infections 06/06/2018    History of urinary tract infection    Rheumatoid arthritis, unspecified (Multi)     Rheumatoid arthritis    Trochanteric bursitis, left hip 09/19/2016    Greater trochanteric bursitis of left hip    Unspecified acute conjunctivitis, bilateral 01/16/2017    Acute conjunctivitis of both eyes, unspecified acute conjunctivitis type    Urinary tract infection, site not specified 07/03/2019    Acute lower UTI     Past Surgical History:   Procedure Laterality Date    CHOLECYSTECTOMY  01/04/2017    Cholecystectomy    DILATION AND CURETTAGE OF UTERUS   01/12/2017    Dilation And Curettage    ROTATOR CUFF REPAIR  01/04/2017    Rotator Cuff Repair    TOTAL ABDOMINAL HYSTERECTOMY  01/12/2017    Total Abdominal Hysterectomy    TOTAL KNEE ARTHROPLASTY  01/04/2017    Total Knee Arthroplasty     Family History   Problem Relation Name Age of Onset    Coronary artery disease Mother      Arthritis Mother      Other (cardiac disorder) Mother      Hypertension Mother          essential    Heart attack Mother      Anxiety disorder Father      Depression Father      Arthritis Father      Blindness Father          or visual loss    Stroke Father      Deafness Father          or hearing loss    Hypertension Father          essential    Hyperlipidemia Father      Heart attack Father      Diabetes type II Father      Asthma Sister      Diabetes Sister      Hypertension Sister      Kidney disease Sister      Alcohol abuse Brother      Deep vein thrombosis Brother      Anxiety disorder Maternal Grandmother      Depression Maternal Grandmother      Arthritis Maternal Grandmother      Deafness Maternal Grandmother          or hearing loss    Diabetes Maternal Grandmother      Hypertension Maternal Grandmother      Heart attack Maternal Grandmother      Asthma Maternal Grandfather      Heart attack Maternal Grandfather      Asthma Paternal Grandmother      Heart attack Paternal Grandmother      Anxiety disorder Paternal Grandfather      Depression Paternal Grandfather      Asthma Paternal Grandfather      Deafness Paternal Grandfather          or hearing loss    Heart attack Paternal Grandfather       Social History     Tobacco Use    Smoking status: Never    Smokeless tobacco: Never   Vaping Use    Vaping status: Never Used   Substance Use Topics    Alcohol use: Never    Drug use: Never       Physical Exam   ED Triage Vitals [06/13/24 1654]   Temperature Heart Rate Respirations BP   36.1 °C (97 °F) 80 18 98/57      Pulse Ox Temp Source Heart Rate Source Patient Position   95 % Temporal  Monitor Sitting      BP Location FiO2 (%)     -- --       Physical Exam  Vitals and nursing note reviewed.   Constitutional:       General: She is not in acute distress.     Appearance: Normal appearance. She is not toxic-appearing.   HENT:      Head: Normocephalic and atraumatic.      Nose: Nose normal.      Mouth/Throat:      Mouth: Mucous membranes are moist.      Pharynx: Oropharynx is clear.   Eyes:      Extraocular Movements: Extraocular movements intact.      Conjunctiva/sclera: Conjunctivae normal.      Pupils: Pupils are equal, round, and reactive to light.   Cardiovascular:      Rate and Rhythm: Normal rate and regular rhythm.      Pulses: Normal pulses.      Heart sounds: Normal heart sounds.   Pulmonary:      Effort: Pulmonary effort is normal. No respiratory distress.      Breath sounds: Normal breath sounds.   Abdominal:      General: Abdomen is flat. Bowel sounds are normal.      Palpations: Abdomen is soft.      Tenderness: There is no abdominal tenderness.   Musculoskeletal:         General: Normal range of motion.      Cervical back: Normal range of motion and neck supple.      Right lower leg: Edema present.      Left lower leg: Edema present.      Left ankle: Tenderness present.   Skin:     General: Skin is warm and dry.      Coloration: Skin is not jaundiced or pale.      Findings: No bruising.   Neurological:      General: No focal deficit present.      Mental Status: She is alert and oriented to person, place, and time. Mental status is at baseline.   Psychiatric:         Mood and Affect: Mood normal.         Behavior: Behavior normal.         ED Course & MDM   Diagnoses as of 06/14/24 2310   Shortness of breath       Medical Decision Making  Summary:  Medical Decision Making:   Patient presented as described in HPI. Patient case including ROS, PE, and treatment and plan discussed with ED attending if attached as cosigner. Results from labs and or imaging included below if completed. Erick LOPEZ  Jb  is a 79 y.o. coming in for Patient presents with:  Leg Swelling  .  Patient is complaining of leg swelling.  She states that most of the swelling and discomfort is to the left leg.  Injury to leg.  These have been occurring over the last few months and she reports no acute new symptoms over the last few days.  Patient's sister is also at bedside and patient states this as well.  She states that her symptoms feel similar to when she has had CHF before the past.  She denies any worsening shortness of breath.  Due to her complaint lab work as well as imaging was completed.  Troponin is stable at 27&29.  She has no electrolyte abnormalities of concern.  Kidney function is at baseline patient was given furosemide.  She did have urinary output.  Imaging was completed of lower extremities including ultrasound as well as x-ray.  She is tender to palpation to the ankle of the left leg.  Ultrasound is negative for DVT.  X-ray does show soft tissue swelling.  No other concerning imaging or lab abnormalities.  She is made aware of all findings.  Patient will be discharged and advised follow-up with her cardiologist as well as her PCP.  Again she has no acute new symptoms but states that these been occurring multiple months.  She states she does feel comfortable going home and agrees with treatment plan and follow-up but also agrees that if she has any worsening or changing of her symptoms especially new acute symptoms she will repeat her evaluation here in the emergency room.      Disposition is completed with shared decision making with the patient or guardian present with the patient. They were advised to follow up with PCP or recommended provider in 2-3 days for another evaluation and exam. I advised the patient to return or go to closest emergency room immediately if symptoms change, get worse, or new symptoms develop prior to follow up. I explained the plan and treatment course. Patient/guardian is in agreement with  plan, treatment course, and follow up and state that they will comply.    Labs Reviewed  CBC WITH AUTO DIFFERENTIAL - Abnormal     WBC                           7.8                    nRBC                          0.0                    RBC                           3.23 (*)               Hemoglobin                    10.1 (*)               Hematocrit                    31.3 (*)               MCV                           97                     MCH                           31.3                   MCHC                          32.3                   RDW                           16.6 (*)               Platelets                     199                    Neutrophils %                 79.8                   Immature Granulocytes %, Automated   0.6                    Lymphocytes %                 9.1                    Monocytes %                   7.7                    Eosinophils %                 2.3                    Basophils %                   0.5                    Neutrophils Absolute          6.19 (*)               Immature Granulocytes Absolute, Au*   0.05                   Lymphocytes Absolute          0.71 (*)               Monocytes Absolute            0.60                   Eosinophils Absolute          0.18                   Basophils Absolute            0.04                COMPREHENSIVE METABOLIC PANEL - Abnormal     Glucose                       136 (*)                Sodium                        137                    Potassium                     3.8                    Chloride                      96 (*)                 Bicarbonate                   34 (*)                 Anion Gap                     11                     Urea Nitrogen                 20                     Creatinine                    1.33 (*)               eGFR                          41 (*)                 Calcium                       9.1                    Albumin                       3.7                    Alkaline  Phosphatase          82                     Total Protein                 6.0 (*)                AST                           17                     Bilirubin, Total              1.4 (*)                ALT                           11                  B-TYPE NATRIURETIC PEPTIDE - Abnormal     BNP                           735 (*)                  Narrative:    <100 pg/mL - Heart failure unlikely                100-299 pg/mL - Intermediate probability of acute heart                                failure exacerbation. Correlate with clinical                                context and patient history.                  >=300 pg/mL - Heart Failure likely. Correlate with clinical                                context and patient history.                                BNP testing is performed using different testing methodology at [facility] than at other [facility] hospitals. Direct result comparisons should only be made within the same method.                   SERIAL TROPONIN-INITIAL - Abnormal     Troponin I, High Sensitivity   27 (*)                   Narrative: Less than 99th percentile of normal range cutoff-                Female and children under 18 years old <14 ng/L; Male <21 ng/L: Negative                Repeat testing should be performed if clinically indicated.                                 Female and children under 18 years old 14-50 ng/L; Male 21-50 ng/L:                Consistent with possible cardiac damage and possible increased clinical                 risk. Serial measurements may help to assess extent of myocardial damage.                                 >50 ng/L: Consistent with cardiac damage, increased clinical risk and                myocardial infarction. Serial measurements may help assess extent of                 myocardial damage.                                  NOTE: Children less than 1 year old may have higher baseline troponin                 levels and results should be  interpreted in conjunction with the overall                 clinical context.                                 NOTE: Troponin I testing is performed using a different                 testing methodology at [facility] than at other                 system hospitals. Direct result comparisons should only                 be made within the same method.  SERIAL TROPONIN, 1 HOUR - Abnormal     Troponin I, High Sensitivity   29 (*)                   Narrative: Less than 99th percentile of normal range cutoff-                Female and children under 18 years old <14 ng/L; Male <21 ng/L: Negative                Repeat testing should be performed if clinically indicated.                                 Female and children under 18 years old 14-50 ng/L; Male 21-50 ng/L:                Consistent with possible cardiac damage and possible increased clinical                 risk. Serial measurements may help to assess extent of myocardial damage.                                 >50 ng/L: Consistent with cardiac damage, increased clinical risk and                myocardial infarction. Serial measurements may help assess extent of                 myocardial damage.                                  NOTE: Children less than 1 year old may have higher baseline troponin                 levels and results should be interpreted in conjunction with the overall                 clinical context.                                 NOTE: Troponin I testing is performed using a different                 testing methodology at [facility] than at other                 [facility] hospitals. Direct result comparisons should only                 be made within the same method.  TROPONIN SERIES- (INITIAL, 1 HR)   Vascular US Lower Extremity Venous Duplex Left   Final Result    No deep venous thrombosis of the  left lower extremity.          MACRO:    None          Signed by: Dami Strickland 6/13/2024 6:42 PM    Dictation  workstation:   [id number]     XR chest 1 view   Final Result    1.  Enlarged cardiac silhouette without definite focal consolidation    within constraints of imaging. Pulmonary vascular congestion not    excluded.                      MACRO:    None          Signed by: Mary Mccallum 6/13/2024 6:04 PM    Dictation workstation:   [id number]     XR ankle left 3+ views   Final Result    Soft tissue swelling and ossification along the medial malleolus.    Clinical correlation with point tenderness is suggested.          Demineralization and degenerative changes.          MACRO:    None          Signed by: Mary Mccallum 6/13/2024 6:06 PM    Dictation workstation:   [id number]                            Tests/Medications/Escalations of Care considered but not given: As in MDM    Patient care discussed with: N/A  Social Determinants affecting care: N/A    Final diagnosis and disposition as documented     Diagnoses as of 06/14/24 2311  Shortness of breath       Shared decision making was completed and determined that patient will be discharged. I discussed the differential; results and discharge plan with the patient and/or family/friend/caregiver if present.  I emphasized the importance of follow-up with the physician I referred them to in the timeframe recommended.  I explained reasons for the patient to return to the Emergency Department. They agreed that if they feel their condition is worsening or if they have any other concern they should call 911 immediately for further assistance. I gave the patient an opportunity to ask all questions they had and answered all of them accordingly. They understand return precautions and discharge instructions. The patient and/or family/friend/caregiver expressed understanding verbally and that they would comply.     Disposition: Discharge      This note has been transcribed using voice recognition and may contain grammatical errors, misplaced words, incorrect words, incorrect  phrases or other errors.         Procedure  Procedures     Jason Gamble PA-C  06/14/24 2120

## 2024-06-13 NOTE — ED TRIAGE NOTES
Pt presented to the ED with c/o leg swelling. Pt has been having bilateral LE swelling for 5 mo. Pt has hx of CHF and takes a diuretic x 2 a day. PCP advised pt to come into ED. Pt has left ankle pain when walking but jose other pain. +1 edema noted.

## 2024-06-14 LAB
Q ONSET: 213 MS
QRS COUNT: 14 BEATS
QRS DURATION: 138 MS
QT INTERVAL: 428 MS
QTC CALCULATION(BAZETT): 505 MS
QTC FREDERICIA: 478 MS
R AXIS: -26 DEGREES
T AXIS: 149 DEGREES
T OFFSET: 427 MS
VENTRICULAR RATE: 84 BPM

## 2024-06-24 ENCOUNTER — APPOINTMENT (OUTPATIENT)
Dept: CARDIOLOGY | Facility: HOSPITAL | Age: 79
End: 2024-06-24
Payer: COMMERCIAL

## 2024-06-24 ENCOUNTER — HOSPITAL ENCOUNTER (INPATIENT)
Facility: HOSPITAL | Age: 79
LOS: 3 days | Discharge: HOSPICE | End: 2024-06-27
Attending: STUDENT IN AN ORGANIZED HEALTH CARE EDUCATION/TRAINING PROGRAM | Admitting: INTERNAL MEDICINE
Payer: COMMERCIAL

## 2024-06-24 ENCOUNTER — APPOINTMENT (OUTPATIENT)
Dept: RADIOLOGY | Facility: HOSPITAL | Age: 79
End: 2024-06-24
Payer: COMMERCIAL

## 2024-06-24 DIAGNOSIS — I50.9 ACUTE CONGESTIVE HEART FAILURE, UNSPECIFIED HEART FAILURE TYPE (MULTI): ICD-10-CM

## 2024-06-24 DIAGNOSIS — I50.9 ACUTE ON CHRONIC CONGESTIVE HEART FAILURE, UNSPECIFIED HEART FAILURE TYPE (MULTI): Primary | ICD-10-CM

## 2024-06-24 DIAGNOSIS — R94.31 ABNORMAL ELECTROCARDIOGRAM (ECG) (EKG): ICD-10-CM

## 2024-06-24 LAB
ALBUMIN SERPL BCP-MCNC: 4.8 G/DL (ref 3.4–5)
ALP SERPL-CCNC: 109 U/L (ref 33–136)
ALT SERPL W P-5'-P-CCNC: 39 U/L (ref 7–45)
ANION GAP BLDV CALCULATED.4IONS-SCNC: 2 MMOL/L (ref 10–25)
ANION GAP SERPL CALC-SCNC: 16 MMOL/L (ref 10–20)
AST SERPL W P-5'-P-CCNC: 43 U/L (ref 9–39)
BASE EXCESS BLDV CALC-SCNC: 13.8 MMOL/L (ref -2–3)
BASOPHILS # BLD AUTO: 0.08 X10*3/UL (ref 0–0.1)
BASOPHILS NFR BLD AUTO: 0.8 %
BILIRUB SERPL-MCNC: 1.1 MG/DL (ref 0–1.2)
BNP SERPL-MCNC: 2014 PG/ML (ref 0–99)
BODY TEMPERATURE: ABNORMAL
BUN SERPL-MCNC: 51 MG/DL (ref 6–23)
CA-I BLDV-SCNC: 1.23 MMOL/L (ref 1.1–1.33)
CALCIUM SERPL-MCNC: 10.2 MG/DL (ref 8.6–10.3)
CARDIAC TROPONIN I PNL SERPL HS: 67 NG/L (ref 0–13)
CARDIAC TROPONIN I PNL SERPL HS: 82 NG/L (ref 0–13)
CHLORIDE BLDV-SCNC: 94 MMOL/L (ref 98–107)
CHLORIDE SERPL-SCNC: 90 MMOL/L (ref 98–107)
CO2 SERPL-SCNC: 33 MMOL/L (ref 21–32)
CREAT SERPL-MCNC: 1.74 MG/DL (ref 0.5–1.05)
CRP SERPL-MCNC: 0.54 MG/DL
EGFRCR SERPLBLD CKD-EPI 2021: 30 ML/MIN/1.73M*2
EOSINOPHIL # BLD AUTO: 0.02 X10*3/UL (ref 0–0.4)
EOSINOPHIL NFR BLD AUTO: 0.2 %
ERYTHROCYTE [DISTWIDTH] IN BLOOD BY AUTOMATED COUNT: 18.8 % (ref 11.5–14.5)
FLUAV RNA RESP QL NAA+PROBE: NOT DETECTED
FLUBV RNA RESP QL NAA+PROBE: NOT DETECTED
GLUCOSE BLD MANUAL STRIP-MCNC: 152 MG/DL (ref 74–99)
GLUCOSE BLDV-MCNC: 186 MG/DL (ref 74–99)
GLUCOSE SERPL-MCNC: 179 MG/DL (ref 74–99)
HCO3 BLDV-SCNC: 42.2 MMOL/L (ref 22–26)
HCT VFR BLD AUTO: 40.4 % (ref 36–46)
HCT VFR BLD EST: 37 % (ref 36–46)
HGB BLD-MCNC: 13.7 G/DL (ref 12–16)
HGB BLDV-MCNC: 12.2 G/DL (ref 12–16)
IMM GRANULOCYTES # BLD AUTO: 0.29 X10*3/UL (ref 0–0.5)
IMM GRANULOCYTES NFR BLD AUTO: 2.8 % (ref 0–0.9)
INHALED O2 CONCENTRATION: 28 %
INR PPP: 7.8 (ref 0.9–1.1)
LACTATE BLDV-SCNC: 1.4 MMOL/L (ref 0.4–2)
LYMPHOCYTES # BLD AUTO: 0.78 X10*3/UL (ref 0.8–3)
LYMPHOCYTES NFR BLD AUTO: 7.6 %
MAGNESIUM SERPL-MCNC: 1.92 MG/DL (ref 1.6–2.4)
MCH RBC QN AUTO: 31.8 PG (ref 26–34)
MCHC RBC AUTO-ENTMCNC: 33.9 G/DL (ref 32–36)
MCV RBC AUTO: 94 FL (ref 80–100)
MONOCYTES # BLD AUTO: 0.73 X10*3/UL (ref 0.05–0.8)
MONOCYTES NFR BLD AUTO: 7.1 %
NEUTROPHILS # BLD AUTO: 8.37 X10*3/UL (ref 1.6–5.5)
NEUTROPHILS NFR BLD AUTO: 81.5 %
NRBC BLD-RTO: 0.4 /100 WBCS (ref 0–0)
OXYHGB MFR BLDV: 63.3 % (ref 45–75)
PCO2 BLDV: 73 MM HG (ref 41–51)
PH BLDV: 7.37 PH (ref 7.33–7.43)
PLATELET # BLD AUTO: 504 X10*3/UL (ref 150–450)
PO2 BLDV: 45 MM HG (ref 35–45)
POTASSIUM BLDV-SCNC: 3.6 MMOL/L (ref 3.5–5.3)
POTASSIUM SERPL-SCNC: 4.1 MMOL/L (ref 3.5–5.3)
PROT SERPL-MCNC: 8.2 G/DL (ref 6.4–8.2)
PROTHROMBIN TIME: 89.7 SECONDS (ref 9.8–12.8)
RBC # BLD AUTO: 4.31 X10*6/UL (ref 4–5.2)
SAO2 % BLDV: 66 % (ref 45–75)
SARS-COV-2 RNA RESP QL NAA+PROBE: NOT DETECTED
SODIUM BLDV-SCNC: 135 MMOL/L (ref 136–145)
SODIUM SERPL-SCNC: 135 MMOL/L (ref 136–145)
WBC # BLD AUTO: 10.3 X10*3/UL (ref 4.4–11.3)

## 2024-06-24 PROCEDURE — 83735 ASSAY OF MAGNESIUM: CPT | Performed by: PHYSICIAN ASSISTANT

## 2024-06-24 PROCEDURE — 71046 X-RAY EXAM CHEST 2 VIEWS: CPT | Mod: FOREIGN READ | Performed by: RADIOLOGY

## 2024-06-24 PROCEDURE — 85610 PROTHROMBIN TIME: CPT | Performed by: PHYSICIAN ASSISTANT

## 2024-06-24 PROCEDURE — 87636 SARSCOV2 & INF A&B AMP PRB: CPT | Performed by: PHYSICIAN ASSISTANT

## 2024-06-24 PROCEDURE — 96374 THER/PROPH/DIAG INJ IV PUSH: CPT

## 2024-06-24 PROCEDURE — 93005 ELECTROCARDIOGRAM TRACING: CPT

## 2024-06-24 PROCEDURE — 83880 ASSAY OF NATRIURETIC PEPTIDE: CPT | Performed by: PHYSICIAN ASSISTANT

## 2024-06-24 PROCEDURE — 86140 C-REACTIVE PROTEIN: CPT

## 2024-06-24 PROCEDURE — 80053 COMPREHEN METABOLIC PANEL: CPT | Performed by: PHYSICIAN ASSISTANT

## 2024-06-24 PROCEDURE — 2500000005 HC RX 250 GENERAL PHARMACY W/O HCPCS

## 2024-06-24 PROCEDURE — 9420000001 HC RT PATIENT EDUCATION 5 MIN

## 2024-06-24 PROCEDURE — 36415 COLL VENOUS BLD VENIPUNCTURE: CPT | Performed by: PHYSICIAN ASSISTANT

## 2024-06-24 PROCEDURE — 2500000001 HC RX 250 WO HCPCS SELF ADMINISTERED DRUGS (ALT 637 FOR MEDICARE OP)

## 2024-06-24 PROCEDURE — 71046 X-RAY EXAM CHEST 2 VIEWS: CPT

## 2024-06-24 PROCEDURE — 2500000005 HC RX 250 GENERAL PHARMACY W/O HCPCS: Performed by: STUDENT IN AN ORGANIZED HEALTH CARE EDUCATION/TRAINING PROGRAM

## 2024-06-24 PROCEDURE — 87634 RSV DNA/RNA AMP PROBE: CPT

## 2024-06-24 PROCEDURE — 2500000004 HC RX 250 GENERAL PHARMACY W/ HCPCS (ALT 636 FOR OP/ED): Performed by: PHYSICIAN ASSISTANT

## 2024-06-24 PROCEDURE — 94760 N-INVAS EAR/PLS OXIMETRY 1: CPT

## 2024-06-24 PROCEDURE — 84132 ASSAY OF SERUM POTASSIUM: CPT | Performed by: PHYSICIAN ASSISTANT

## 2024-06-24 PROCEDURE — 84484 ASSAY OF TROPONIN QUANT: CPT | Performed by: PHYSICIAN ASSISTANT

## 2024-06-24 PROCEDURE — 85025 COMPLETE CBC W/AUTO DIFF WBC: CPT | Performed by: PHYSICIAN ASSISTANT

## 2024-06-24 PROCEDURE — 99285 EMERGENCY DEPT VISIT HI MDM: CPT | Mod: 25

## 2024-06-24 PROCEDURE — 82947 ASSAY GLUCOSE BLOOD QUANT: CPT

## 2024-06-24 PROCEDURE — 1200000002 HC GENERAL ROOM WITH TELEMETRY DAILY

## 2024-06-24 PROCEDURE — 84484 ASSAY OF TROPONIN QUANT: CPT | Mod: 91 | Performed by: PHYSICIAN ASSISTANT

## 2024-06-24 RX ORDER — METOPROLOL SUCCINATE 50 MG/1
50 TABLET, EXTENDED RELEASE ORAL 2 TIMES DAILY
Status: DISCONTINUED | OUTPATIENT
Start: 2024-06-24 | End: 2024-06-26

## 2024-06-24 RX ORDER — POLYETHYLENE GLYCOL 3350 17 G/17G
17 POWDER, FOR SOLUTION ORAL DAILY
Status: DISCONTINUED | OUTPATIENT
Start: 2024-06-25 | End: 2024-06-27

## 2024-06-24 RX ORDER — ALLOPURINOL 100 MG/1
200 TABLET ORAL DAILY
Status: DISCONTINUED | OUTPATIENT
Start: 2024-06-24 | End: 2024-06-27

## 2024-06-24 RX ORDER — ACETAMINOPHEN 160 MG/5ML
650 SOLUTION ORAL EVERY 6 HOURS PRN
Status: DISCONTINUED | OUTPATIENT
Start: 2024-06-24 | End: 2024-06-26

## 2024-06-24 RX ORDER — LEVOTHYROXINE SODIUM 112 UG/1
112 TABLET ORAL
Status: DISCONTINUED | OUTPATIENT
Start: 2024-06-25 | End: 2024-06-27

## 2024-06-24 RX ORDER — ACETAMINOPHEN 650 MG/1
650 SUPPOSITORY RECTAL EVERY 6 HOURS PRN
Status: DISCONTINUED | OUTPATIENT
Start: 2024-06-24 | End: 2024-06-26

## 2024-06-24 RX ORDER — CEPHALEXIN 250 MG/1
250 CAPSULE ORAL DAILY
Status: DISCONTINUED | OUTPATIENT
Start: 2024-06-24 | End: 2024-06-25

## 2024-06-24 RX ORDER — DEXTROSE 50 % IN WATER (D50W) INTRAVENOUS SYRINGE
12.5
Status: DISCONTINUED | OUTPATIENT
Start: 2024-06-24 | End: 2024-06-26

## 2024-06-24 RX ORDER — ACETAMINOPHEN 325 MG/1
650 TABLET ORAL EVERY 6 HOURS PRN
Status: DISCONTINUED | OUTPATIENT
Start: 2024-06-24 | End: 2024-06-26

## 2024-06-24 RX ORDER — FUROSEMIDE 10 MG/ML
40 INJECTION INTRAMUSCULAR; INTRAVENOUS ONCE
Status: COMPLETED | OUTPATIENT
Start: 2024-06-24 | End: 2024-06-24

## 2024-06-24 RX ORDER — DEXTROSE 50 % IN WATER (D50W) INTRAVENOUS SYRINGE
25
Status: DISCONTINUED | OUTPATIENT
Start: 2024-06-24 | End: 2024-06-26

## 2024-06-24 RX ORDER — OXYBUTYNIN CHLORIDE 5 MG/1
5 TABLET ORAL 3 TIMES DAILY
Status: DISCONTINUED | OUTPATIENT
Start: 2024-06-24 | End: 2024-06-27 | Stop reason: HOSPADM

## 2024-06-24 RX ORDER — PANTOPRAZOLE SODIUM 40 MG/1
40 TABLET, DELAYED RELEASE ORAL
Status: DISCONTINUED | OUTPATIENT
Start: 2024-06-25 | End: 2024-06-27

## 2024-06-24 RX ORDER — CHOLECALCIFEROL (VITAMIN D3) 25 MCG
2000 TABLET ORAL DAILY
Status: DISCONTINUED | OUTPATIENT
Start: 2024-06-25 | End: 2024-06-26

## 2024-06-24 RX ORDER — ASPIRIN 81 MG/1
81 TABLET ORAL DAILY
Status: DISCONTINUED | OUTPATIENT
Start: 2024-06-24 | End: 2024-06-25

## 2024-06-24 RX ORDER — INSULIN LISPRO 100 [IU]/ML
0-5 INJECTION, SOLUTION INTRAVENOUS; SUBCUTANEOUS
Status: DISCONTINUED | OUTPATIENT
Start: 2024-06-25 | End: 2024-06-26

## 2024-06-24 RX ORDER — TORSEMIDE 20 MG/1
60 TABLET ORAL 2 TIMES DAILY
Status: DISCONTINUED | OUTPATIENT
Start: 2024-06-24 | End: 2024-06-27 | Stop reason: HOSPADM

## 2024-06-24 SDOH — SOCIAL STABILITY: SOCIAL INSECURITY: DO YOU FEEL UNSAFE GOING BACK TO THE PLACE WHERE YOU ARE LIVING?: NO

## 2024-06-24 SDOH — HEALTH STABILITY: MENTAL HEALTH: HOW OFTEN DO YOU HAVE A DRINK CONTAINING ALCOHOL?: NEVER

## 2024-06-24 SDOH — HEALTH STABILITY: MENTAL HEALTH: HOW OFTEN DO YOU HAVE 6 OR MORE DRINKS ON ONE OCCASION?: NEVER

## 2024-06-24 SDOH — SOCIAL STABILITY: SOCIAL INSECURITY: ARE YOU OR HAVE YOU BEEN THREATENED OR ABUSED PHYSICALLY, EMOTIONALLY, OR SEXUALLY BY ANYONE?: NO

## 2024-06-24 SDOH — HEALTH STABILITY: MENTAL HEALTH: HOW MANY STANDARD DRINKS CONTAINING ALCOHOL DO YOU HAVE ON A TYPICAL DAY?: PATIENT DOES NOT DRINK

## 2024-06-24 SDOH — SOCIAL STABILITY: SOCIAL INSECURITY: DOES ANYONE TRY TO KEEP YOU FROM HAVING/CONTACTING OTHER FRIENDS OR DOING THINGS OUTSIDE YOUR HOME?: NO

## 2024-06-24 SDOH — SOCIAL STABILITY: SOCIAL INSECURITY: HAS ANYONE EVER THREATENED TO HURT YOUR FAMILY OR YOUR PETS?: NO

## 2024-06-24 SDOH — SOCIAL STABILITY: SOCIAL INSECURITY: DO YOU FEEL ANYONE HAS EXPLOITED OR TAKEN ADVANTAGE OF YOU FINANCIALLY OR OF YOUR PERSONAL PROPERTY?: NO

## 2024-06-24 SDOH — SOCIAL STABILITY: SOCIAL INSECURITY: ABUSE: ADULT

## 2024-06-24 SDOH — SOCIAL STABILITY: SOCIAL INSECURITY: HAVE YOU HAD THOUGHTS OF HARMING ANYONE ELSE?: NO

## 2024-06-24 SDOH — SOCIAL STABILITY: SOCIAL INSECURITY: HAVE YOU HAD ANY THOUGHTS OF HARMING ANYONE ELSE?: NO

## 2024-06-24 SDOH — SOCIAL STABILITY: SOCIAL INSECURITY: ARE THERE ANY APPARENT SIGNS OF INJURIES/BEHAVIORS THAT COULD BE RELATED TO ABUSE/NEGLECT?: NO

## 2024-06-24 SDOH — SOCIAL STABILITY: SOCIAL INSECURITY: WERE YOU ABLE TO COMPLETE ALL THE BEHAVIORAL HEALTH SCREENINGS?: YES

## 2024-06-24 ASSESSMENT — ENCOUNTER SYMPTOMS
HALLUCINATIONS: 0
SHORTNESS OF BREATH: 1
CONSTIPATION: 0
FATIGUE: 1
ARTHRALGIAS: 0
WHEEZING: 0
ABDOMINAL DISTENTION: 0
ABDOMINAL PAIN: 0
HEMATURIA: 0
DIZZINESS: 1
COUGH: 1
CHEST TIGHTNESS: 1
CHILLS: 0
UNEXPECTED WEIGHT CHANGE: 1
FEVER: 0
HEADACHES: 0
PALPITATIONS: 1
DIARRHEA: 0

## 2024-06-24 ASSESSMENT — ACTIVITIES OF DAILY LIVING (ADL)
GROOMING: INDEPENDENT
LACK_OF_TRANSPORTATION: NO
JUDGMENT_ADEQUATE_SAFELY_COMPLETE_DAILY_ACTIVITIES: YES
TOILETING: INDEPENDENT
PATIENT'S MEMORY ADEQUATE TO SAFELY COMPLETE DAILY ACTIVITIES?: NO
ADEQUATE_TO_COMPLETE_ADL: YES
WALKS IN HOME: NEEDS ASSISTANCE
HEARING - RIGHT EAR: HEARING AID
HEARING - LEFT EAR: HEARING AID
FEEDING YOURSELF: NEEDS ASSISTANCE
BATHING: NEEDS ASSISTANCE
DRESSING YOURSELF: INDEPENDENT

## 2024-06-24 ASSESSMENT — PAIN - FUNCTIONAL ASSESSMENT
PAIN_FUNCTIONAL_ASSESSMENT: 0-10
PAIN_FUNCTIONAL_ASSESSMENT: 0-10

## 2024-06-24 ASSESSMENT — LIFESTYLE VARIABLES
AUDIT-C TOTAL SCORE: 0
HOW OFTEN DO YOU HAVE 6 OR MORE DRINKS ON ONE OCCASION: NEVER
EVER HAD A DRINK FIRST THING IN THE MORNING TO STEADY YOUR NERVES TO GET RID OF A HANGOVER: NO
AUDIT-C TOTAL SCORE: 0
SKIP TO QUESTIONS 9-10: 1
HOW MANY STANDARD DRINKS CONTAINING ALCOHOL DO YOU HAVE ON A TYPICAL DAY: PATIENT DOES NOT DRINK
HOW MANY STANDARD DRINKS CONTAINING ALCOHOL DO YOU HAVE ON A TYPICAL DAY: PATIENT DOES NOT DRINK
AUDIT-C TOTAL SCORE: 0
HOW OFTEN DO YOU HAVE A DRINK CONTAINING ALCOHOL: NEVER
HOW OFTEN DO YOU HAVE 6 OR MORE DRINKS ON ONE OCCASION: NEVER
HOW OFTEN DO YOU HAVE A DRINK CONTAINING ALCOHOL: NEVER
HAVE PEOPLE ANNOYED YOU BY CRITICIZING YOUR DRINKING: NO
HAVE YOU EVER FELT YOU SHOULD CUT DOWN ON YOUR DRINKING: NO
EVER FELT BAD OR GUILTY ABOUT YOUR DRINKING: NO
SKIP TO QUESTIONS 9-10: 1
AUDIT-C TOTAL SCORE: 0
SKIP TO QUESTIONS 9-10: 1
TOTAL SCORE: 0
AUDIT-C TOTAL SCORE: 0

## 2024-06-24 ASSESSMENT — COGNITIVE AND FUNCTIONAL STATUS - GENERAL
TOILETING: A LITTLE
DAILY ACTIVITIY SCORE: 18
CLIMB 3 TO 5 STEPS WITH RAILING: A LITTLE
DRESSING REGULAR UPPER BODY CLOTHING: A LITTLE
WALKING IN HOSPITAL ROOM: A LITTLE
TURNING FROM BACK TO SIDE WHILE IN FLAT BAD: A LITTLE
MOVING FROM LYING ON BACK TO SITTING ON SIDE OF FLAT BED WITH BEDRAILS: A LITTLE
STANDING UP FROM CHAIR USING ARMS: A LITTLE
DRESSING REGULAR LOWER BODY CLOTHING: A LITTLE
EATING MEALS: A LITTLE
MOVING TO AND FROM BED TO CHAIR: A LITTLE
MOBILITY SCORE: 18
HELP NEEDED FOR BATHING: A LITTLE
PERSONAL GROOMING: A LITTLE
PATIENT BASELINE BEDBOUND: NO

## 2024-06-24 ASSESSMENT — PAIN DESCRIPTION - LOCATION: LOCATION: CHEST

## 2024-06-24 ASSESSMENT — PAIN SCALES - GENERAL
PAINLEVEL_OUTOF10: 0 - NO PAIN
PAINLEVEL_OUTOF10: 4

## 2024-06-24 ASSESSMENT — PATIENT HEALTH QUESTIONNAIRE - PHQ9
1. LITTLE INTEREST OR PLEASURE IN DOING THINGS: NOT AT ALL
SUM OF ALL RESPONSES TO PHQ9 QUESTIONS 1 & 2: 0
2. FEELING DOWN, DEPRESSED OR HOPELESS: NOT AT ALL

## 2024-06-24 ASSESSMENT — PAIN DESCRIPTION - DESCRIPTORS: DESCRIPTORS: ACHING

## 2024-06-24 ASSESSMENT — PAIN DESCRIPTION - PAIN TYPE: TYPE: ACUTE PAIN

## 2024-06-24 NOTE — ED PROVIDER NOTES
HPI   Chief Complaint   Patient presents with    Shortness of Breath       History of present illness:  79-year-old female presents emergency room for complaints of shortness of breath with exertion and generalized fatigue.  The patient is coming by her daughter provides primary history stating that the patient has a medical history of congestive heart failure as well as hypertension hyperlipidemia and chronic kidney disease.  The daughter states that the patient is currently on 120 mg of Lasix a day and it appears that per the charting system patient has a history of chronic kidney disease stage III.  She also has a history of diabetes as well as hypothyroidism and rheumatoid arthritis and gout.  She is followed by cardiology(Dr. Webb).  She was seen there recently and the daughter states that she spoke with the office today and was referred here for further care and treatment.  She states that they attempted to get the patient into her apartment today after she was out running errands and she states that she was not able to even walk to the door she was so tired and sit on the ground.    Social history: Negative for alcohol and drug use.    Review of systems:   Gen.: No weight loss,  fever.   Eyes: No vision loss  ENT: No pharyngitis, dry mouth.   Cardiac: No chest pain, palpitations, syncope, near syncope.   Pulmonary: No hemoptysis.   Heme/lymph: No swollen glands, fever, bleeding.   GI: No abdominal pain, change in bowel habits, melena, hematemesis, hematochezia, nausea, vomiting, diarrhea.   : No discharge, dysuria, frequency, urgency, hematuria.   Musculoskeletal: No limb pain, joint pain, joint swelling.   Skin: No rashes.   Review of systems is otherwise negative unless stated above or in history of present illness.      Physical exam:  General: Vitals noted, no distress. Afebrile.   EENT: No lymphadenopathy   Cardiac: Regular, rate, rhythm, no murmur.   Pulmonary: Diminished lung sounds, no rhonchi  appreciated  Abdomen: Soft, nonsurgical. Nontender. No peritoneal signs. Normoactive bowel sounds.   Extremities: No peripheral edema.   Skin: No rash.   Neuro: No focal neurologic deficits        Medical decision making:   Testing: CBC CMP troponin x 2, BNP, chest x-ray: Chest x-ray shows concerns for vascular congestion and cardiomegaly as interpreted by radiology, BNP is over 2000, troponin was 82 on the first troponin,  Treatment:  Lasix 40 mg IV given, oxygen 2 L NC started  Reevaluation:     EKG taken on June 24, 2024 at 1626 shows A-fib at 91, left bundle branch block which has been seen in previous EKGs, no STEMI    Plan: 79-year-old female presents emergency room for complaints of shortness of breath with exertion and generalized fatigue.  The patient is coming by her daughter provides primary history stating that the patient has a medical history of congestive heart failure as well as hypertension hyperlipidemia and chronic kidney disease.  The daughter states that the patient is currently on 120 mg of Lasix a day and it appears that per the charting system patient has a history of chronic kidney disease stage III.  She also has a history of diabetes as well as hypothyroidism and rheumatoid arthritis and gout.  She is followed by cardiology(Dr. Webb).  She was seen there recently and the daughter states that she spoke with the office today and was referred here for further care and treatment.  She states that they attempted to get the patient into her apartment today after she was out running errands and she states that she was not able to even walk to the door she was so tired and sit on the ground.  The daughter mentions that the patient has a history of A-fib which was recently diagnosed and that she is on Coumadin at this time.  She states that been holding for the past 2 days after she had a Coumadin test performed 3 days ago that was very high and they went again today and had it drawn again and  apparently was still elevated.  Cardiac: Regular, rate, rhythm, no murmur. Pulmonary: Diminished lung sounds, no rhonchi appreciated.  I explained to the patient and to her daughter at bedside the test results at this time need for further admission and treatment as she appear to be in heart failure.  The family is in agreement with this plan the patient is admitted to the care of the medicine team.  Impression:   1.  Heart failure  2. Elevated inr          History provided by:  Patient   used: No                        Stephanie Coma Scale Score: 15                     Patient History   Past Medical History:   Diagnosis Date    Achilles tendinitis, unspecified leg 05/19/2014    Achilles tendinitis    Acute kidney failure, unspecified ([facility]) 11/20/2020    REN (acute kidney injury)    Benign paroxysmal vertigo, unspecified ear 01/04/2017    BPPV (benign paroxysmal positional vertigo)    Bitten or stung by nonvenomous insect and other nonvenomous arthropods, initial encounter 07/23/2019    Insect bite, unspecified site, initial encounter    Cellulitis of face 01/17/2017    Cellulitis and abscess of face    Chronic systolic (congestive) heart failure (Multi) 09/11/2021    Chronic systolic congestive heart failure    Diarrhea, unspecified 01/04/2019    Acute diarrhea    Impacted cerumen, bilateral 04/24/2019    Impacted cerumen of both ears    Lesion of ulnar nerve, unspecified upper limb 05/19/2014    Cubital tunnel syndrome    Localized edema 07/29/2019    Edema of both legs    Other acute sinusitis 07/03/2019    Other acute sinusitis, recurrence not specified    Other conditions influencing health status     Osteoarthritis    Other microscopic hematuria 06/01/2017    Hematuria, microscopic    Other specified symptoms and signs involving the circulatory and respiratory systems 12/15/2021    Choking episode    Personal history of diseases of the blood and blood-forming organs and certain disorders  involving the immune mechanism 01/24/2020    History of thrombocytopenia    Personal history of diseases of the blood and blood-forming organs and certain disorders involving the immune mechanism     History of iron deficiency anemia    Personal history of other diseases of the circulatory system 03/31/2020    History of intermittent claudication    Personal history of other diseases of the circulatory system     History of hypertension    Personal history of other diseases of the respiratory system 03/27/2017    History of acute sinusitis    Personal history of other endocrine, nutritional and metabolic disease     History of hypothyroidism    Personal history of other specified conditions 12/22/2021    History of hoarseness    Personal history of other specified conditions 07/23/2019    History of dysuria    Personal history of pneumonia (recurrent) 02/10/2020    History of community acquired pneumonia    Personal history of urinary (tract) infections 06/06/2018    History of urinary tract infection    Rheumatoid arthritis, unspecified (Multi)     Rheumatoid arthritis    Trochanteric bursitis, left hip 09/19/2016    Greater trochanteric bursitis of left hip    Unspecified acute conjunctivitis, bilateral 01/16/2017    Acute conjunctivitis of both eyes, unspecified acute conjunctivitis type    Urinary tract infection, site not specified 07/03/2019    Acute lower UTI     Past Surgical History:   Procedure Laterality Date    CHOLECYSTECTOMY  01/04/2017    Cholecystectomy    DILATION AND CURETTAGE OF UTERUS  01/12/2017    Dilation And Curettage    ROTATOR CUFF REPAIR  01/04/2017    Rotator Cuff Repair    TOTAL ABDOMINAL HYSTERECTOMY  01/12/2017    Total Abdominal Hysterectomy    TOTAL KNEE ARTHROPLASTY  01/04/2017    Total Knee Arthroplasty     Family History   Problem Relation Name Age of Onset    Coronary artery disease Mother      Arthritis Mother      Other (cardiac disorder) Mother      Hypertension Mother           essential    Heart attack Mother      Anxiety disorder Father      Depression Father      Arthritis Father      Blindness Father          or visual loss    Stroke Father      Deafness Father          or hearing loss    Hypertension Father          essential    Hyperlipidemia Father      Heart attack Father      Diabetes type II Father      Asthma Sister      Diabetes Sister      Hypertension Sister      Kidney disease Sister      Alcohol abuse Brother      Deep vein thrombosis Brother      Anxiety disorder Maternal Grandmother      Depression Maternal Grandmother      Arthritis Maternal Grandmother      Deafness Maternal Grandmother          or hearing loss    Diabetes Maternal Grandmother      Hypertension Maternal Grandmother      Heart attack Maternal Grandmother      Asthma Maternal Grandfather      Heart attack Maternal Grandfather      Asthma Paternal Grandmother      Heart attack Paternal Grandmother      Anxiety disorder Paternal Grandfather      Depression Paternal Grandfather      Asthma Paternal Grandfather      Deafness Paternal Grandfather          or hearing loss    Heart attack Paternal Grandfather       Social History     Tobacco Use    Smoking status: Never    Smokeless tobacco: Never   Vaping Use    Vaping status: Never Used   Substance Use Topics    Alcohol use: Never    Drug use: Never       Physical Exam   ED Triage Vitals [06/24/24 1552]   Temperature Heart Rate Respirations BP   36.8 °C (98.2 °F) 84 20 101/69      Pulse Ox Temp Source Heart Rate Source Patient Position   (!) 90 % Temporal Monitor --      BP Location FiO2 (%)     -- --       Physical Exam    ED Course & MDM   Diagnoses as of 06/24/24 1803   Acute on chronic congestive heart failure, unspecified heart failure type (Multi)       Medical Decision Making      Procedure  Procedures     Tien Murphy PA-C  06/24/24 [id number]

## 2024-06-24 NOTE — ED TRIAGE NOTES
Patient c/o SOB, cough, congestion and generalized weakness that has worsened over the past few days. Patient was recently diagnosed with Afib, but is currently not on any treatment for it.

## 2024-06-24 NOTE — H&P
Internal Medicine - History and Physical Note      Patient: Erick Muhammad, Age: 79 y.o., SEX: female , MRN:[id number], ROOM:243/[facility],  Code: DNR and No Intubation   Admitted On: 6/24/2024   Admitting Dx: Acute on chronic congestive heart failure, unspecified heart failure type (Multi) [I50.9]  PCP: Cornelio Peck DO        Attending: Ronald Moreland DO        Chief Complaint   Patient: Erick Muhammad is a 79 y.o. female who presented to the hospital for   Chief Complaint   Patient presents with    Shortness of Breath       HPI   Erick Muhammad is a 79 y.o. year old female  patient with past medical history significant for CKD (baseline GFR 41 to 45%, baseline creatinine 1.21 to 1.26), hypothyroidism, chronic anemia, HFpEF (EF 55-60% from 01/20/2024), rheumatoid arthritis, gout, LUCY (does not use CPAP), prediabetes (last HbA1c from 10/31/2023 at 5.7), vitamin D deficiency, recurrent UTIs (on daily cephalexin prophylaxis) was admitted on account of chest pain, shortness of breath, nausea and abdominal pain    History taken from daughter present at bedside and the patient.  The full course started this morning.  Patient states she felt like she was going to fall.  She said that she was very fatigued and endorsed dizziness.  Doctor stated that at around 1 PM patient called her granddaughter to tell her that she is having significant chest pain, substernal, described as a feeling of tightness, rated between 8-10 on a scale of 1-10.  Patient stated that nothing relieves the pain.  Did state that pain was associated with shortness of breath.  In regards to exertion, patient states that she usually gets short of breath on exertion and that is her baseline.  Patient also has ongoing cough that has been present for past couple of weeks.  Patient stated that she was previously seen on 06/13/2024 at [facility] ED for complaint of shortness of breath and cough.  At that time patient was given IV Lasix and discharged home.  Patient's  daughter states that her cough has since then gotten worse.  Cough is wet in nature.  Patient denies any phlegm production with the cough but the daughter states that she 1.4 see her mother produce yellow phlegm at 1 time.  Patient otherwise denies any fevers, chills.  Patient states that she always feels cold, tired.  Patient has chronic lower extremity edema    Patient was recently diagnosed with A-fib at Dr. Webb's office and started on rate control with metoprolol XL 50 mg twice daily.  Patient was also started on warfarin (unsure of the dose).  Patient states that she took warfarin from Monday till Friday and on Friday they checked the PT/INR and found that patient was supratherapeutic.  Patient's warfarin was subsequently held.  Patient currently denies any active bleeding, bleeding per rectally, hematuria, epistaxis.    ROS  Review of Systems   Constitutional:  Positive for fatigue and unexpected weight change. Negative for chills and fever.   Respiratory:  Positive for cough, chest tightness and shortness of breath. Negative for wheezing.    Cardiovascular:  Positive for chest pain, palpitations and leg swelling.   Gastrointestinal:  Negative for abdominal distention, abdominal pain, constipation and diarrhea.   Endocrine: Positive for cold intolerance.   Genitourinary:  Negative for hematuria.   Musculoskeletal:  Negative for arthralgias.   Neurological:  Positive for dizziness. Negative for headaches.   Psychiatric/Behavioral:  Negative for hallucinations.       9 Point ROS negative unless otherwise specified above.     ED COURSE:   VS: Temperature 98.2 F, /69 mmHg, heart rate 84 bpm, respiration 20/min, O2 sat 90% onRA    Labs  - CBC: Leukocytes 10.3, H/H13.7/40.4, platelets 504  - BMP: Glucose 117 9, sodium 135, potassium 4.1, chloride 90, bicarb 33, BUN 9, creatinine 1.74  - LFTs: Albumin 4.8, alk phos 109, ALT 39, AST 43  - BNP: 2014  - Trop I: 82 --> 67  - PT/INR: 89.7/ 7.8  - VBG: pH 7.37, pCO2  73, pO2 45  - Lactate: 1.4  - COVID-19 PCR: Pending  - Influenza A & B: Pending    Imaging:  CXR: Mild vascular congestion without overt edema     Interventions:   Patient was given IV Lasix 40 mg once.  Patient was also placed on 2 L nasal cannula.    Past Medical History:   CKD (baseline GFR 41 to 45%, baseline creatinine 1.21 to 1.26), hypothyroidism, chronic anemia, HFpEF (EF 55-60% from 01/20/2024), rheumatoid arthritis, gout, LUCY (does not use CPAP), prediabetes (last HbA1c from 10/31/2023 at 5.7), vitamin D deficiency, recurrent UTIs (on daily cephalexin prophylaxis)    Echo 01/20/2024:  1. The left ventricular systolic function is normal with a 55-60% estimated ejection fraction.  2. Spectral Doppler shows an impaired relaxation pattern of left ventricular diastolic filling.  3. The left atrium is mild to moderately dilated.  4. There is mild to moderate mitral valve stenosis.  5. There is moderate mitral annular calcification.  6. Mild aortic valve stenosis.  7. There is mild tricuspid regurgitation.  8. The estimated pulmonary artery pressure is mildly elevated with the RVSP at 44.6 mmHg.    Past Surgical History: Hysterectomy, cholecystectomy, rotator cuff surgery  Allergies: Morphine  Family History: Diabetes mellitus in family.  Heart problems in father  Home Meds: Cephalexin 250 mg every day, vitamin D 2000 units every day, omeprazole 40 mg every day, Rinvoq 15 mg every day, allopurinol 200 mg every day, levothyroxine 112 mcg every day, oxybutynin 15 mg every day, torsemide 60 mg twice daily    Social History:  - Coming from apartment in [place].  Lives all by herself.  Has no pets at home  - Tobacco: Denies  - Alcohol: Denies  - Illicit Drug: Denies    HealthCare Providers:  - PCP: Cornelio Peck DO   - Cardiologist: Dr. Bimal Webb MD    Code Status: DNR and No Intubation     Emergency contact: Extended Emergency Contact Information  Primary Emergency Contact: Eliana Frederick  Home Phone:  [phone]  Mobile Phone: [phone]  Relation: Child     Past Medical History     Past Medical History:   Diagnosis Date    Achilles tendinitis, unspecified leg 05/19/2014    Achilles tendinitis    Acute kidney failure, unspecified (CMS-[facility]) 11/20/2020    REN (acute kidney injury)    Benign paroxysmal vertigo, unspecified ear 01/04/2017    BPPV (benign paroxysmal positional vertigo)    Bitten or stung by nonvenomous insect and other nonvenomous arthropods, initial encounter 07/23/2019    Insect bite, unspecified site, initial encounter    Cellulitis of face 01/17/2017    Cellulitis and abscess of face    Chronic systolic (congestive) heart failure (Multi) 09/11/2021    Chronic systolic congestive heart failure    Diarrhea, unspecified 01/04/2019    Acute diarrhea    Impacted cerumen, bilateral 04/24/2019    Impacted cerumen of both ears    Lesion of ulnar nerve, unspecified upper limb 05/19/2014    Cubital tunnel syndrome    Localized edema 07/29/2019    Edema of both legs    Other acute sinusitis 07/03/2019    Other acute sinusitis, recurrence not specified    Other conditions influencing health status     Osteoarthritis    Other microscopic hematuria 06/01/2017    Hematuria, microscopic    Other specified symptoms and signs involving the circulatory and respiratory systems 12/15/2021    Choking episode    Personal history of diseases of the blood and blood-forming organs and certain disorders involving the immune mechanism 01/24/2020    History of thrombocytopenia    Personal history of diseases of the blood and blood-forming organs and certain disorders involving the immune mechanism     History of iron deficiency anemia    Personal history of other diseases of the circulatory system 03/31/2020    History of intermittent claudication    Personal history of other diseases of the circulatory system     History of hypertension    Personal history of other diseases of the respiratory system 03/27/2017    History  of acute sinusitis    Personal history of other endocrine, nutritional and metabolic disease     History of hypothyroidism    Personal history of other specified conditions 12/22/2021    History of hoarseness    Personal history of other specified conditions 07/23/2019    History of dysuria    Personal history of pneumonia (recurrent) 02/10/2020    History of community acquired pneumonia    Personal history of urinary (tract) infections 06/06/2018    History of urinary tract infection    Rheumatoid arthritis, unspecified (Multi)     Rheumatoid arthritis    Trochanteric bursitis, left hip 09/19/2016    Greater trochanteric bursitis of left hip    Unspecified acute conjunctivitis, bilateral 01/16/2017    Acute conjunctivitis of both eyes, unspecified acute conjunctivitis type    Urinary tract infection, site not specified 07/03/2019    Acute lower UTI        Surgical History     Past Surgical History:   Procedure Laterality Date    CHOLECYSTECTOMY  01/04/2017    Cholecystectomy    DILATION AND CURETTAGE OF UTERUS  01/12/2017    Dilation And Curettage    ROTATOR CUFF REPAIR  01/04/2017    Rotator Cuff Repair    TOTAL ABDOMINAL HYSTERECTOMY  01/12/2017    Total Abdominal Hysterectomy    TOTAL KNEE ARTHROPLASTY  01/04/2017    Total Knee Arthroplasty       Family History     Family History   Problem Relation Name Age of Onset    Coronary artery disease Mother      Arthritis Mother      Other (cardiac disorder) Mother      Hypertension Mother          essential    Heart attack Mother      Anxiety disorder Father      Depression Father      Arthritis Father      Blindness Father          or visual loss    Stroke Father      Deafness Father          or hearing loss    Hypertension Father          essential    Hyperlipidemia Father      Heart attack Father      Diabetes type II Father      Asthma Sister      Diabetes Sister      Hypertension Sister      Kidney disease Sister      Alcohol abuse Brother      Deep vein  thrombosis Brother      Anxiety disorder Maternal Grandmother      Depression Maternal Grandmother      Arthritis Maternal Grandmother      Deafness Maternal Grandmother          or hearing loss    Diabetes Maternal Grandmother      Hypertension Maternal Grandmother      Heart attack Maternal Grandmother      Asthma Maternal Grandfather      Heart attack Maternal Grandfather      Asthma Paternal Grandmother      Heart attack Paternal Grandmother      Anxiety disorder Paternal Grandfather      Depression Paternal Grandfather      Asthma Paternal Grandfather      Deafness Paternal Grandfather          or hearing loss    Heart attack Paternal Grandfather         Social History     Social History     Socioeconomic History    Marital status:      Spouse name: Not on file    Number of children: Not on file    Years of education: Not on file    Highest education level: Not on file   Occupational History    Not on file   Tobacco Use    Smoking status: Never    Smokeless tobacco: Never   Vaping Use    Vaping status: Never Used   Substance and Sexual Activity    Alcohol use: Never    Drug use: Never    Sexual activity: Not on file   Other Topics Concern    Not on file   Social History Narrative    Not on file     Social Determinants of Health     Financial Resource Strain: Not on file   Food Insecurity: Not on file   Transportation Needs: Not on file   Physical Activity: Not on file   Stress: Not on file   Social Connections: Not on file   Intimate Partner Violence: Not on file   Housing Stability: Not on file       Tobacco Use: Low Risk  (6/24/2024)    Patient History     Smoking Tobacco Use: Never     Smokeless Tobacco Use: Never     Passive Exposure: Not on file        Social History     Substance and Sexual Activity   Alcohol Use Never        Allergies     Allergies   Allergen Reactions    Carvedilol Other     Vomiting    Lisinopril Cough    Metoprolol Nausea Only    Milk Containing Products (Dairy) Diarrhea      Dairy    Morphine Hives     Morphine Derivatives    Nitrofurantoin Monohyd/M-Cryst Nausea Only    Trazodone Dizziness     Trazodone and Deriv          Meds    Scheduled medications  allopurinol, 200 mg, oral, Daily  aspirin, 81 mg, oral, Daily  cephalexin, 250 mg, oral, Daily  [START ON 6/25/2024] cholecalciferol, 2,000 Units, oral, Daily  [START ON 6/25/2024] insulin lispro, 0-5 Units, subcutaneous, TID  [START ON 6/25/2024] levothyroxine, 112 mcg, oral, Daily before breakfast  metoprolol succinate XL, 50 mg, oral, BID  oxybutynin, 5 mg, oral, TID  oxygen, , inhalation, Continuous - Inhalation  [START ON 6/25/2024] pantoprazole, 40 mg, oral, Daily before breakfast  [START ON 6/25/2024] polyethylene glycol, 17 g, oral, Daily  torsemide, 60 mg, oral, BID  upadacitinib ER, 15 mg, oral, Daily      Continuous medications     PRN medications  PRN medications: acetaminophen **OR** acetaminophen **OR** acetaminophen, dextrose, dextrose, glucagon, glucagon     Objective    Physical Exam  Constitutional:       Appearance: Normal appearance.   HENT:      Head: Normocephalic and atraumatic.      Mouth/Throat:      Mouth: Mucous membranes are moist.   Cardiovascular:      Rate and Rhythm: Normal rate. Rhythm irregular.      Heart sounds: Murmur heard.      No friction rub. No gallop.   Pulmonary:      Comments: Bilaterally reduced lung sounds, especially on expiration  Abdominal:      General: Abdomen is flat. Bowel sounds are normal.      Palpations: There is no mass.      Tenderness: There is no abdominal tenderness. There is no guarding.      Hernia: A hernia is present.   Musculoskeletal:         General: Swelling present. No tenderness. Normal range of motion.      Cervical back: Normal range of motion.      Right lower leg: Edema present.   Neurological:      General: No focal deficit present.      Mental Status: She is alert and oriented to person, place, and time.   Psychiatric:         Mood and Affect: Mood normal.     "     Behavior: Behavior normal.          Visit Vitals  BP (!) 105/93 (BP Location: Left arm, Patient Position: Lying)   Pulse 95   Temp 36.1 °C (97 °F) (Temporal)   Resp 19   Ht 1.422 m (4' 8\")   Wt 69.5 kg (153 lb 3.5 oz)   SpO2 97%   BMI 34.35 kg/m²   OB Status Postmenopausal   Smoking Status Never   BSA 1.66 m²        No intake or output data in the 24 hours ending 06/24/24 1936          Labs:   Results from last 72 hours   Lab Units 06/24/24  1600   SODIUM mmol/L 135*   POTASSIUM mmol/L 4.1   CHLORIDE mmol/L 90*   CO2 mmol/L 33*   BUN mg/dL 51*   CREATININE mg/dL 1.74*   GLUCOSE mg/dL 179*   CALCIUM mg/dL 10.2   ANION GAP mmol/L 16   EGFR mL/min/1.73m*2 30*      Results from last 72 hours   Lab Units 06/24/24  1600   WBC AUTO x10*3/uL 10.3   HEMOGLOBIN g/dL 13.7   HEMATOCRIT % 40.4   PLATELETS AUTO x10*3/uL 504*   NEUTROS PCT AUTO % 81.5   LYMPHS PCT AUTO % 7.6   MONOS PCT AUTO % 7.1   EOS PCT AUTO % 0.2      Lab Results   Component Value Date    CALCIUM 10.2 06/24/2024      Lab Results   Component Value Date    CRP 0.21 12/01/2023        Images    XR chest 2 views  Narrative: STUDY:  Chest Radiographs;  06/24/2024 at 4:08 PM   INDICATION:  Shortness of breath.  COMPARISON:  XR chest 06/13/24, 03/21/20, 02/10/20.  ACCESSION NUMBER(S):  [id number]  ORDERING CLINICIAN:  GLADYS SWANSON  TECHNIQUE:  Frontal and lateral chest.   FINDINGS:  CARDIOMEDIASTINAL SILHOUETTE:  Heart is enlarged with increased pulmonary vascularity.     LUNGS:  Lungs are clear.     ABDOMEN:  No remarkable upper abdominal findings.     BONES:  No acute osseous changes.  Impression: Mild vascular congestion without overt edema.  Signed by Tano Slade MD       Encounter Date: 06/13/24   ECG 12 lead   Result Value    Ventricular Rate 84    QRS Duration 138    QT Interval 428    QTC Calculation(Bazett) 505    R Axis -26    T Axis 149    QRS Count 14    Q Onset 213    T Offset 427    QTC Fredericia 478    Narrative    Atrial " fibrillation  Left bundle branch block  Abnormal ECG  When compared with ECG of 28-NOV-2020 14:14,  Atrial fibrillation has replaced Sinus rhythm  Vent. rate has increased BY  47 BPM  QT has lengthened  See ED provider note for full interpretation and clinical correlation  Confirmed by Sasha Barajas ([id number]) on 6/20/2024 5:18:12 PM      Encounter Date: 06/13/24   ECG 12 lead   Result Value    Ventricular Rate 84    QRS Duration 138    QT Interval 428    QTC Calculation(Bazett) 505    R Axis -26    T Axis 149    QRS Count 14    Q Onset 213    T Offset 427    QTC Fredericia 478    Narrative    Atrial fibrillation  Left bundle branch block  Abnormal ECG  When compared with ECG of 28-NOV-2020 14:14,  Atrial fibrillation has replaced Sinus rhythm  Vent. rate has increased BY  47 BPM  QT has lengthened  See ED provider note for full interpretation and clinical correlation  Confirmed by Sasha Barajas ([id number]) on 6/20/2024 5:18:12 PM       Assessment and Plan    Erick Muhammad is a 79 y.o. female admitted on 6/24/2024 for acute shortness of breath and chest pain accompanied with nausea.  Past medical history significant for CKD (baseline GFR 41 to 45%, baseline creatinine 1.21 to 1.26), hypothyroidism, chronic anemia, HFpEF (EF 55-60% from 01/20/2024), rheumatoid arthritis, gout, LUCY (does not use CPAP), prediabetes (last HbA1c from 10/31/2023 at 5.7), vitamin D deficiency, recurrent UTIs (on daily cephalexin prophylaxis).  Currently being managed for cute hypoxic respiratory failure secondary to acute exacerbation of congestive heart failure    ACUTE MEDICAL ISSUES:  # Acute hypoxic respiratory failure secondary to acute on chronic congestive heart failure  # HFpEF   - Sx: Does not use oxygen at home.  Currently seen saturating appropriately on 2 L nasal cannula.  Shortness of breath which worsens with exertion, chest pain  - Labs available: BNP elevated at 2014. Trop I 82 --> 67  - Labs Ordered: Procal, flu A, flu B, COVID-19  PCR  - Imaging: CXR shows Lungs are clear.  Mild vascular congestion without overt edema   - Recent Echo from 01/20/2021 with LVEF 55 to 60%, impaired relaxation pattern of left ventricular diastolic filling, left atrium mild to moderately dilated, mild to moderate mitral valve stenosis, moderate mitral valve calcification, aortic valve stenosis, mild TR, RVSP at 44.6  [ ] Ordered Echo; pending  - Tx: Continue home torsemide 60 mg twice daily with as needed IV Lasix pushes, metoprolol ER succinate 50 mg twice daily, aspirin 81 mg every day  - Supportive: On Tele, K >4 and Mg >2, [place] oxygen to maintain SpO2 greater than 90%.  Pain control with Tylenol 650 mg every 6 hours as needed  - Cardio/ Dr. Webb consulted  - Patient would likely benefit from SGLT2. Would defer to cardiology    # REN on CKD  - Baseline GFR 41 to 45%, baseline creatinine 1.21 to 1.26.  Creatinine elevated at admission to 1.7 with GFR 30  : : Likely cardiorenal in origin   - Labs ordered: Urine electrolytes  - Imaging ordered: Kidney ultrasound  - Supportive: Avoid nephrotoxic medication administration (e.g. NSAID's, aminoglycosides, beta-lactams, quinolones, rifampin, sulfonamides, vancomycin, acyclovir, contrast agents). Daily RFPs    # A. Fib:  - Currently on rate control with metoprolol XL 50 mg twice daily  - Currently holding anticoagulation due to elevated supratherapeutic PT/INR.  - Trend PT/INR daily    # NIDDM-II:  - Currently not on any treatment.  Last HbA1c from 10/31/2023 at 5.7   - Ordered mild SSI #1 with AC plus at bedtime Accu-Cheks.  Hypoglycemia protocol in place    CHRONIC MEDICAL ISSUES:  # LUCY: Not on home CPAP  # Recurrent UTIs: Continue cephalexin 250 mg every day  # RA: Follows with Dr. Major. Cont Rinvoq 15mg every day. Patient to bring from their home as we do not carry inpatient  # Gout: Continue allopurinol 200 mg every day  # Vitamin D deficiency: Continue home vitamin D 2000 units every day  # Hypothyroidism:  Continue home levothyroxine 112 mcg every day   # Urge continence: Switched home oxybutynin XL 15 mg to oxybutynin 5 mg 3 times daily due to formulary availability.   # Constipation: On MiraLAX 17 g every day  # GERD: Switched home omeprazole 40 mg every day to pantoprazole 40 mg every day    Fluids: 1500 cc fluid restriction  Electrolytes: Replete to keep potassium greater than 4 and magnesium greater than 2  Nutrition: Adult cardiac diet  Antimicrobials: None indicated  DVT ppx: Holding secondary to elevated PT/INR  GI ppx: Pantoprazole 40 mg every day  Catheter: External female catheter  Lines: 20-gauge PIV in left proximal ventral forearm  Supplemental Oxygen: On 2 L nasal cannula  Emergency Contact: Extended Emergency Contact Information  Primary Emergency Contact: Eliana Frederick  Home Phone: [phone]  Mobile Phone: [phone]  Relation: Child   Code: DNR and No Intubation     Disposition: Currently pending echo.  ELOS greater than 48 hours    Mary Carr MD  Internal Medicine, PGY- 1  06/24/24 at 7:36 PM     Disclaimer: Documentation completed with the information available at the time of input. The times in the chart may not be reflective of actual patient care times, interventions, or procedures. Documentation occurs after the physical care of the patient. This note was dictated by speech recognition. Minor errors in transcription may be present

## 2024-06-25 ENCOUNTER — APPOINTMENT (OUTPATIENT)
Dept: RADIOLOGY | Facility: HOSPITAL | Age: 79
End: 2024-06-25
Payer: COMMERCIAL

## 2024-06-25 ENCOUNTER — APPOINTMENT (OUTPATIENT)
Dept: CARDIOLOGY | Facility: HOSPITAL | Age: 79
End: 2024-06-25
Payer: COMMERCIAL

## 2024-06-25 LAB
ALBUMIN SERPL BCP-MCNC: 3.8 G/DL (ref 3.4–5)
ALBUMIN SERPL BCP-MCNC: 3.9 G/DL (ref 3.4–5)
ALP SERPL-CCNC: 102 U/L (ref 33–136)
ALP SERPL-CCNC: 82 U/L (ref 33–136)
ALT SERPL W P-5'-P-CCNC: 28 U/L (ref 7–45)
ALT SERPL W P-5'-P-CCNC: 34 U/L (ref 7–45)
AMMONIA PLAS-SCNC: 34 UMOL/L (ref 16–53)
ANION GAP BLDA CALCULATED.4IONS-SCNC: 8 MMO/L (ref 10–25)
ANION GAP BLDV CALCULATED.4IONS-SCNC: 8 MMOL/L (ref 10–25)
ANION GAP SERPL CALC-SCNC: 14 MMOL/L (ref 10–20)
ANION GAP SERPL CALC-SCNC: 16 MMOL/L (ref 10–20)
APPEARANCE UR: ABNORMAL
ARTERIAL PATENCY WRIST A: POSITIVE
AST SERPL W P-5'-P-CCNC: 25 U/L (ref 9–39)
AST SERPL W P-5'-P-CCNC: 33 U/L (ref 9–39)
ATRIAL RATE: 101 BPM
BASE EXCESS BLDA CALC-SCNC: 7.4 MMOL/L (ref -2–3)
BASE EXCESS BLDV CALC-SCNC: 7.6 MMOL/L (ref -2–3)
BILIRUB SERPL-MCNC: 0.8 MG/DL (ref 0–1.2)
BILIRUB SERPL-MCNC: 1.1 MG/DL (ref 0–1.2)
BILIRUB UR STRIP.AUTO-MCNC: NEGATIVE MG/DL
BODY TEMPERATURE: ABNORMAL
BODY TEMPERATURE: ABNORMAL
BUN SERPL-MCNC: 51 MG/DL (ref 6–23)
BUN SERPL-MCNC: 54 MG/DL (ref 6–23)
CA-I BLDA-SCNC: 1.17 MMOL/L (ref 1.1–1.33)
CA-I BLDV-SCNC: 1.18 MMOL/L (ref 1.1–1.33)
CALCIUM SERPL-MCNC: 9.1 MG/DL (ref 8.6–10.3)
CALCIUM SERPL-MCNC: 9.2 MG/DL (ref 8.6–10.3)
CHLORIDE BLDA-SCNC: 92 MMOL/L (ref 98–107)
CHLORIDE BLDV-SCNC: 92 MMOL/L (ref 98–107)
CHLORIDE SERPL-SCNC: 91 MMOL/L (ref 98–107)
CHLORIDE SERPL-SCNC: 93 MMOL/L (ref 98–107)
CHLORIDE UR-SCNC: 28 MMOL/L
CHLORIDE/CREATININE (MMOL/G) IN URINE: 40 MMOL/G CREAT (ref 38–318)
CO2 SERPL-SCNC: 32 MMOL/L (ref 21–32)
CO2 SERPL-SCNC: 35 MMOL/L (ref 21–32)
COLOR UR: ABNORMAL
CREAT SERPL-MCNC: 1.8 MG/DL (ref 0.5–1.05)
CREAT SERPL-MCNC: 1.97 MG/DL (ref 0.5–1.05)
CREAT UR-MCNC: 68 MG/DL (ref 20–320)
CREAT UR-MCNC: 70.1 MG/DL (ref 20–320)
CREAT UR-MCNC: 70.1 MG/DL (ref 20–320)
CRP SERPL-MCNC: 0.51 MG/DL
EGFRCR SERPLBLD CKD-EPI 2021: 25 ML/MIN/1.73M*2
EGFRCR SERPLBLD CKD-EPI 2021: 28 ML/MIN/1.73M*2
ERYTHROCYTE [DISTWIDTH] IN BLOOD BY AUTOMATED COUNT: 18.6 % (ref 11.5–14.5)
ERYTHROCYTE [DISTWIDTH] IN BLOOD BY AUTOMATED COUNT: 18.7 % (ref 11.5–14.5)
ERYTHROCYTE [SEDIMENTATION RATE] IN BLOOD BY WESTERGREN METHOD: 6 MM/H (ref 0–30)
GLUCOSE BLD MANUAL STRIP-MCNC: 183 MG/DL (ref 74–99)
GLUCOSE BLD MANUAL STRIP-MCNC: 186 MG/DL (ref 74–99)
GLUCOSE BLD MANUAL STRIP-MCNC: 196 MG/DL (ref 74–99)
GLUCOSE BLDA-MCNC: 193 MG/DL (ref 74–99)
GLUCOSE BLDV-MCNC: 144 MG/DL (ref 74–99)
GLUCOSE SERPL-MCNC: 140 MG/DL (ref 74–99)
GLUCOSE SERPL-MCNC: 162 MG/DL (ref 74–99)
GLUCOSE UR STRIP.AUTO-MCNC: NORMAL MG/DL
HCO3 BLDA-SCNC: 35.1 MMOL/L (ref 22–26)
HCO3 BLDV-SCNC: 36.1 MMOL/L (ref 22–26)
HCT VFR BLD AUTO: 35.7 % (ref 36–46)
HCT VFR BLD AUTO: 36.3 % (ref 36–46)
HCT VFR BLD EST: 35 % (ref 36–46)
HCT VFR BLD EST: 38 % (ref 36–46)
HGB BLD-MCNC: 11.5 G/DL (ref 12–16)
HGB BLD-MCNC: 11.8 G/DL (ref 12–16)
HGB BLDA-MCNC: 11.7 G/DL (ref 12–16)
HGB BLDV-MCNC: 12.6 G/DL (ref 12–16)
HYALINE CASTS #/AREA URNS AUTO: ABNORMAL /LPF
INHALED O2 CONCENTRATION: 28 %
INHALED O2 CONCENTRATION: 28 %
INR PPP: 7.2 (ref 0.9–1.1)
KETONES UR STRIP.AUTO-MCNC: NEGATIVE MG/DL
LACTATE BLDA-SCNC: 1.2 MMOL/L (ref 0.4–2)
LACTATE BLDV-SCNC: 1.5 MMOL/L (ref 0.4–2)
LACTATE SERPL-SCNC: 1.3 MMOL/L (ref 0.4–2)
LEUKOCYTE ESTERASE UR QL STRIP.AUTO: ABNORMAL
MAGNESIUM SERPL-MCNC: 1.82 MG/DL (ref 1.6–2.4)
MAGNESIUM SERPL-MCNC: 1.92 MG/DL (ref 1.6–2.4)
MCH RBC QN AUTO: 31.1 PG (ref 26–34)
MCH RBC QN AUTO: 31.3 PG (ref 26–34)
MCHC RBC AUTO-ENTMCNC: 32.2 G/DL (ref 32–36)
MCHC RBC AUTO-ENTMCNC: 32.5 G/DL (ref 32–36)
MCV RBC AUTO: 96 FL (ref 80–100)
MCV RBC AUTO: 97 FL (ref 80–100)
MRSA DNA SPEC QL NAA+PROBE: NOT DETECTED
MUCOUS THREADS #/AREA URNS AUTO: ABNORMAL /LPF
NITRITE UR QL STRIP.AUTO: NEGATIVE
NRBC BLD-RTO: 0.2 /100 WBCS (ref 0–0)
NRBC BLD-RTO: 0.2 /100 WBCS (ref 0–0)
OXYHGB MFR BLDA: 96.2 % (ref 94–98)
OXYHGB MFR BLDV: 92.8 % (ref 45–75)
PCO2 BLDA: 65 MM HG (ref 38–42)
PCO2 BLDV: 70 MM HG (ref 41–51)
PH BLDA: 7.34 PH (ref 7.38–7.42)
PH BLDV: 7.32 PH (ref 7.33–7.43)
PH UR STRIP.AUTO: 5.5 [PH]
PHOSPHATE SERPL-MCNC: 4.7 MG/DL (ref 2.5–4.9)
PHOSPHATE SERPL-MCNC: 4.7 MG/DL (ref 2.5–4.9)
PLATELET # BLD AUTO: 358 X10*3/UL (ref 150–450)
PLATELET # BLD AUTO: 400 X10*3/UL (ref 150–450)
PO2 BLDA: 93 MM HG (ref 85–95)
PO2 BLDV: 73 MM HG (ref 35–45)
POTASSIUM BLDA-SCNC: 3.9 MMOL/L (ref 3.5–5.3)
POTASSIUM BLDV-SCNC: 3.9 MMOL/L (ref 3.5–5.3)
POTASSIUM SERPL-SCNC: 3.5 MMOL/L (ref 3.5–5.3)
POTASSIUM SERPL-SCNC: 4.1 MMOL/L (ref 3.5–5.3)
POTASSIUM UR-SCNC: 18 MMOL/L
POTASSIUM/CREAT UR-RTO: 26 MMOL/G CREAT
PROCALCITONIN SERPL-MCNC: 0.05 NG/ML
PROT SERPL-MCNC: 6.1 G/DL (ref 6.4–8.2)
PROT SERPL-MCNC: 6.4 G/DL (ref 6.4–8.2)
PROT UR STRIP.AUTO-MCNC: NEGATIVE MG/DL
PROTHROMBIN TIME: 82.7 SECONDS (ref 9.8–12.8)
Q ONSET: 213 MS
QRS COUNT: 15 BEATS
QRS DURATION: 144 MS
QT INTERVAL: 386 MS
QTC CALCULATION(BAZETT): 479 MS
QTC FREDERICIA: 447 MS
R AXIS: -30 DEGREES
RBC # BLD AUTO: 3.7 X10*6/UL (ref 4–5.2)
RBC # BLD AUTO: 3.77 X10*6/UL (ref 4–5.2)
RBC # UR STRIP.AUTO: NEGATIVE /UL
RBC #/AREA URNS AUTO: ABNORMAL /HPF
RSV RNA RESP QL NAA+PROBE: NOT DETECTED
SAO2 % BLDA: 99 % (ref 94–100)
SAO2 % BLDV: 96 % (ref 45–75)
SODIUM BLDA-SCNC: 131 MMOL/L (ref 136–145)
SODIUM BLDV-SCNC: 132 MMOL/L (ref 136–145)
SODIUM SERPL-SCNC: 135 MMOL/L (ref 136–145)
SODIUM SERPL-SCNC: 138 MMOL/L (ref 136–145)
SODIUM UR-SCNC: 25 MMOL/L
SODIUM UR-SCNC: 25 MMOL/L
SODIUM/CREAT UR-RTO: 36 MMOL/G CREAT
SODIUM/CREAT UR-RTO: 36 MMOL/G CREAT
SP GR UR STRIP.AUTO: 1.01
SPECIMEN DRAWN FROM PATIENT: ABNORMAL
SQUAMOUS #/AREA URNS AUTO: ABNORMAL /HPF
T AXIS: 158 DEGREES
T OFFSET: 406 MS
T4 FREE SERPL-MCNC: 2.43 NG/DL (ref 0.61–1.12)
TSH SERPL-ACNC: 0.13 MIU/L (ref 0.44–3.98)
UROBILINOGEN UR STRIP.AUTO-MCNC: NORMAL MG/DL
VENTRICULAR RATE: 93 BPM
WBC # BLD AUTO: 18.1 X10*3/UL (ref 4.4–11.3)
WBC # BLD AUTO: 22.9 X10*3/UL (ref 4.4–11.3)
WBC #/AREA URNS AUTO: ABNORMAL /HPF

## 2024-06-25 PROCEDURE — 71045 X-RAY EXAM CHEST 1 VIEW: CPT | Performed by: RADIOLOGY

## 2024-06-25 PROCEDURE — 76770 US EXAM ABDO BACK WALL COMP: CPT

## 2024-06-25 PROCEDURE — 85027 COMPLETE CBC AUTOMATED: CPT | Mod: 91

## 2024-06-25 PROCEDURE — 36415 COLL VENOUS BLD VENIPUNCTURE: CPT

## 2024-06-25 PROCEDURE — 82570 ASSAY OF URINE CREATININE: CPT | Mod: GEALAB

## 2024-06-25 PROCEDURE — 71250 CT THORAX DX C-: CPT | Performed by: RADIOLOGY

## 2024-06-25 PROCEDURE — 85652 RBC SED RATE AUTOMATED: CPT

## 2024-06-25 PROCEDURE — 94760 N-INVAS EAR/PLS OXIMETRY 1: CPT

## 2024-06-25 PROCEDURE — 76770 US EXAM ABDO BACK WALL COMP: CPT | Performed by: RADIOLOGY

## 2024-06-25 PROCEDURE — 84439 ASSAY OF FREE THYROXINE: CPT | Mod: 91

## 2024-06-25 PROCEDURE — 87040 BLOOD CULTURE FOR BACTERIA: CPT | Mod: GEALAB

## 2024-06-25 PROCEDURE — 2500000005 HC RX 250 GENERAL PHARMACY W/O HCPCS

## 2024-06-25 PROCEDURE — 84300 ASSAY OF URINE SODIUM: CPT | Mod: GEALAB

## 2024-06-25 PROCEDURE — 84443 ASSAY THYROID STIM HORMONE: CPT

## 2024-06-25 PROCEDURE — 87640 STAPH A DNA AMP PROBE: CPT

## 2024-06-25 PROCEDURE — 36600 WITHDRAWAL OF ARTERIAL BLOOD: CPT

## 2024-06-25 PROCEDURE — 94667 MNPJ CHEST WALL 1ST: CPT

## 2024-06-25 PROCEDURE — 82746 ASSAY OF FOLIC ACID SERUM: CPT | Mod: GEALAB

## 2024-06-25 PROCEDURE — 2500000004 HC RX 250 GENERAL PHARMACY W/ HCPCS (ALT 636 FOR OP/ED): Performed by: NURSE PRACTITIONER

## 2024-06-25 PROCEDURE — 82140 ASSAY OF AMMONIA: CPT | Performed by: INTERNAL MEDICINE

## 2024-06-25 PROCEDURE — 74176 CT ABD & PELVIS W/O CONTRAST: CPT

## 2024-06-25 PROCEDURE — 84100 ASSAY OF PHOSPHORUS: CPT | Mod: 91

## 2024-06-25 PROCEDURE — 2500000004 HC RX 250 GENERAL PHARMACY W/ HCPCS (ALT 636 FOR OP/ED): Mod: JZ

## 2024-06-25 PROCEDURE — 84100 ASSAY OF PHOSPHORUS: CPT

## 2024-06-25 PROCEDURE — 84145 PROCALCITONIN (PCT): CPT | Mod: GEALAB

## 2024-06-25 PROCEDURE — 93005 ELECTROCARDIOGRAM TRACING: CPT

## 2024-06-25 PROCEDURE — 2500000002 HC RX 250 W HCPCS SELF ADMINISTERED DRUGS (ALT 637 FOR MEDICARE OP, ALT 636 FOR OP/ED)

## 2024-06-25 PROCEDURE — 2500000001 HC RX 250 WO HCPCS SELF ADMINISTERED DRUGS (ALT 637 FOR MEDICARE OP)

## 2024-06-25 PROCEDURE — 83735 ASSAY OF MAGNESIUM: CPT | Mod: 91

## 2024-06-25 PROCEDURE — 71045 X-RAY EXAM CHEST 1 VIEW: CPT

## 2024-06-25 PROCEDURE — 74176 CT ABD & PELVIS W/O CONTRAST: CPT | Performed by: RADIOLOGY

## 2024-06-25 PROCEDURE — 9420000001 HC RT PATIENT EDUCATION 5 MIN

## 2024-06-25 PROCEDURE — 85610 PROTHROMBIN TIME: CPT

## 2024-06-25 PROCEDURE — 2020000001 HC ICU ROOM DAILY

## 2024-06-25 PROCEDURE — 82436 ASSAY OF URINE CHLORIDE: CPT | Mod: GEALAB

## 2024-06-25 PROCEDURE — 84132 ASSAY OF SERUM POTASSIUM: CPT

## 2024-06-25 PROCEDURE — 87086 URINE CULTURE/COLONY COUNT: CPT | Mod: GEALAB

## 2024-06-25 PROCEDURE — 99291 CRITICAL CARE FIRST HOUR: CPT

## 2024-06-25 PROCEDURE — 82947 ASSAY GLUCOSE BLOOD QUANT: CPT | Mod: 91

## 2024-06-25 PROCEDURE — 83605 ASSAY OF LACTIC ACID: CPT | Mod: 91

## 2024-06-25 PROCEDURE — 84484 ASSAY OF TROPONIN QUANT: CPT

## 2024-06-25 PROCEDURE — 94640 AIRWAY INHALATION TREATMENT: CPT

## 2024-06-25 PROCEDURE — 84132 ASSAY OF SERUM POTASSIUM: CPT | Mod: 91

## 2024-06-25 PROCEDURE — 2500000004 HC RX 250 GENERAL PHARMACY W/ HCPCS (ALT 636 FOR OP/ED)

## 2024-06-25 PROCEDURE — 94799 UNLISTED PULMONARY SVC/PX: CPT

## 2024-06-25 PROCEDURE — 83735 ASSAY OF MAGNESIUM: CPT

## 2024-06-25 PROCEDURE — 3E033XZ INTRODUCTION OF VASOPRESSOR INTO PERIPHERAL VEIN, PERCUTANEOUS APPROACH: ICD-10-PCS | Performed by: INTERNAL MEDICINE

## 2024-06-25 PROCEDURE — 70450 CT HEAD/BRAIN W/O DYE: CPT

## 2024-06-25 PROCEDURE — 82607 VITAMIN B-12: CPT | Mod: GEALAB

## 2024-06-25 PROCEDURE — 85027 COMPLETE CBC AUTOMATED: CPT

## 2024-06-25 PROCEDURE — 84439 ASSAY OF FREE THYROXINE: CPT | Performed by: INTERNAL MEDICINE

## 2024-06-25 PROCEDURE — 94660 CPAP INITIATION&MGMT: CPT

## 2024-06-25 PROCEDURE — 87040 BLOOD CULTURE FOR BACTERIA: CPT | Mod: 91,GEALAB

## 2024-06-25 PROCEDURE — 5A09357 ASSISTANCE WITH RESPIRATORY VENTILATION, LESS THAN 24 CONSECUTIVE HOURS, CONTINUOUS POSITIVE AIRWAY PRESSURE: ICD-10-PCS | Performed by: INTERNAL MEDICINE

## 2024-06-25 PROCEDURE — 70450 CT HEAD/BRAIN W/O DYE: CPT | Performed by: RADIOLOGY

## 2024-06-25 PROCEDURE — 99223 1ST HOSP IP/OBS HIGH 75: CPT

## 2024-06-25 PROCEDURE — 86140 C-REACTIVE PROTEIN: CPT

## 2024-06-25 PROCEDURE — 87641 MR-STAPH DNA AMP PROBE: CPT

## 2024-06-25 PROCEDURE — 81001 URINALYSIS AUTO W/SCOPE: CPT

## 2024-06-25 PROCEDURE — 84443 ASSAY THYROID STIM HORMONE: CPT | Mod: 91

## 2024-06-25 PROCEDURE — 82374 ASSAY BLOOD CARBON DIOXIDE: CPT

## 2024-06-25 RX ORDER — WARFARIN SODIUM 5 MG/1
5 TABLET ORAL NIGHTLY
COMMUNITY
Start: 2024-06-17

## 2024-06-25 RX ORDER — OLANZAPINE 10 MG/2ML
10 INJECTION, POWDER, FOR SOLUTION INTRAMUSCULAR ONCE
Status: COMPLETED | OUTPATIENT
Start: 2024-06-25 | End: 2024-06-25

## 2024-06-25 RX ORDER — IPRATROPIUM BROMIDE AND ALBUTEROL SULFATE 2.5; .5 MG/3ML; MG/3ML
3 SOLUTION RESPIRATORY (INHALATION) 3 TIMES DAILY
Status: DISCONTINUED | OUTPATIENT
Start: 2024-06-26 | End: 2024-06-27

## 2024-06-25 RX ORDER — VANCOMYCIN HYDROCHLORIDE 1 G/20ML
INJECTION, POWDER, LYOPHILIZED, FOR SOLUTION INTRAVENOUS DAILY PRN
Status: DISCONTINUED | OUTPATIENT
Start: 2024-06-25 | End: 2024-06-26

## 2024-06-25 RX ORDER — METOPROLOL SUCCINATE 50 MG/1
1 TABLET, EXTENDED RELEASE ORAL
COMMUNITY
Start: 2024-06-17

## 2024-06-25 RX ORDER — POTASSIUM CHLORIDE 20 MEQ/1
40 TABLET, EXTENDED RELEASE ORAL 2 TIMES DAILY
Status: DISPENSED | OUTPATIENT
Start: 2024-06-25 | End: 2024-06-26

## 2024-06-25 RX ORDER — LANOLIN ALCOHOL/MO/W.PET/CERES
400 CREAM (GRAM) TOPICAL DAILY
Status: DISCONTINUED | OUTPATIENT
Start: 2024-06-25 | End: 2024-06-27

## 2024-06-25 RX ORDER — FUROSEMIDE 10 MG/ML
40 INJECTION INTRAMUSCULAR; INTRAVENOUS
Status: DISCONTINUED | OUTPATIENT
Start: 2024-06-26 | End: 2024-06-27 | Stop reason: HOSPADM

## 2024-06-25 RX ORDER — NYSTATIN 100000 U/G
1 CREAM TOPICAL DAILY
Status: ON HOLD | COMMUNITY
End: 2024-06-25 | Stop reason: WASHOUT

## 2024-06-25 RX ORDER — FUROSEMIDE 10 MG/ML
40 INJECTION INTRAMUSCULAR; INTRAVENOUS ONCE
Status: COMPLETED | OUTPATIENT
Start: 2024-06-25 | End: 2024-06-25

## 2024-06-25 RX ORDER — IPRATROPIUM BROMIDE AND ALBUTEROL SULFATE 2.5; .5 MG/3ML; MG/3ML
3 SOLUTION RESPIRATORY (INHALATION)
Status: DISCONTINUED | OUTPATIENT
Start: 2024-06-25 | End: 2024-06-25

## 2024-06-25 RX ORDER — SPIRONOLACTONE 25 MG/1
25 TABLET ORAL DAILY
Status: ON HOLD | COMMUNITY
End: 2024-06-25 | Stop reason: WASHOUT

## 2024-06-25 RX ORDER — CEFTRIAXONE 1 G/50ML
1 INJECTION, SOLUTION INTRAVENOUS EVERY 24 HOURS
Status: DISCONTINUED | OUTPATIENT
Start: 2024-06-25 | End: 2024-06-25

## 2024-06-25 RX ORDER — IPRATROPIUM BROMIDE AND ALBUTEROL SULFATE 2.5; .5 MG/3ML; MG/3ML
3 SOLUTION RESPIRATORY (INHALATION) EVERY 2 HOUR PRN
Status: DISCONTINUED | OUTPATIENT
Start: 2024-06-25 | End: 2024-06-27 | Stop reason: HOSPADM

## 2024-06-25 RX ORDER — NOREPINEPHRINE BITARTRATE 0.03 MG/ML
.01-1 INJECTION, SOLUTION INTRAVENOUS CONTINUOUS
Status: DISCONTINUED | OUTPATIENT
Start: 2024-06-25 | End: 2024-06-26

## 2024-06-25 RX ORDER — WARFARIN SODIUM 5 MG/1
5 TABLET ORAL DAILY
Status: DISCONTINUED | OUTPATIENT
Start: 2024-06-26 | End: 2024-06-27 | Stop reason: HOSPADM

## 2024-06-25 ASSESSMENT — PAIN SCALES - GENERAL
PAINLEVEL_OUTOF10: 4
PAINLEVEL_OUTOF10: 5 - MODERATE PAIN
PAINLEVEL_OUTOF10: 0 - NO PAIN

## 2024-06-25 ASSESSMENT — COGNITIVE AND FUNCTIONAL STATUS - GENERAL
MOVING TO AND FROM BED TO CHAIR: A LITTLE
TOILETING: A LOT
HELP NEEDED FOR BATHING: A LOT
WALKING IN HOSPITAL ROOM: A LOT
DAILY ACTIVITIY SCORE: 12
DRESSING REGULAR UPPER BODY CLOTHING: A LOT
PERSONAL GROOMING: A LOT
MOVING TO AND FROM BED TO CHAIR: A LOT
STANDING UP FROM CHAIR USING ARMS: A LOT
WALKING IN HOSPITAL ROOM: A LOT
DRESSING REGULAR LOWER BODY CLOTHING: TOTAL
CLIMB 3 TO 5 STEPS WITH RAILING: A LOT
DAILY ACTIVITIY SCORE: 16
DRESSING REGULAR LOWER BODY CLOTHING: A LITTLE
EATING MEALS: A LITTLE
PERSONAL GROOMING: A LITTLE
MOVING FROM LYING ON BACK TO SITTING ON SIDE OF FLAT BED WITH BEDRAILS: A LITTLE
EATING MEALS: A LITTLE
TURNING FROM BACK TO SIDE WHILE IN FLAT BAD: A LOT
TOILETING: A LOT
HELP NEEDED FOR BATHING: A LITTLE
MOBILITY SCORE: 16
CLIMB 3 TO 5 STEPS WITH RAILING: A LOT
STANDING UP FROM CHAIR USING ARMS: A LOT
MOVING FROM LYING ON BACK TO SITTING ON SIDE OF FLAT BED WITH BEDRAILS: A LOT
DRESSING REGULAR UPPER BODY CLOTHING: A LOT
MOBILITY SCORE: 12

## 2024-06-25 ASSESSMENT — PAIN DESCRIPTION - LOCATION: LOCATION: BACK

## 2024-06-25 ASSESSMENT — PAIN - FUNCTIONAL ASSESSMENT
PAIN_FUNCTIONAL_ASSESSMENT: 0-10

## 2024-06-25 NOTE — CARE PLAN
The patient's goals for the shift include  to get some rest  The clinical goals for the shift include feel better and rest

## 2024-06-25 NOTE — HOSPITAL COURSE
Brief HPI:  Erick Muhammad is a 79 y.o. year old female  patient with past medical history significant for CKD (baseline GFR 41 to 45%, baseline creatinine 1.21 to 1.26), hypothyroidism, chronic anemia, HFpEF (EF 55-60% from 01/20/2024), rheumatoid arthritis, gout, LUCY (does not use CPAP), prediabetes (last HbA1c from 10/31/2023 at 5.7), vitamin D deficiency, recurrent UTIs (on daily cephalexin prophylaxis) was admitted on account of chest pain, shortness of breath, nausea and abdominal pain     Hospital Course:  In ED, patient was found to have no leukocytosis.  Her BNP was elevated at 2014.  Her troponins were initially elevated at 2 and subsequently down trended to 67.  Lactate was normal at 1.4.  COVID-19 PCR, influenza AMB were negative. chest x-ray was ordered which ordered which showed mild vascular congestion without overt edema.  Patient was admitted for acute heart failure congestion cardiology was consulted.  On subsequent day patient was seen to have worsening mentation.  Patient was pan scanned.  CT head was negative.  CT chest abdomen and pelvis  was positive for findings concerning for pneumonia.  ICU course: Pt was transferred on 6/25/24  near about 2144 for increased oxygen demand and hypotension suspecting septic versus cardiogenic shock and was started on Levophed.  After coming to the ICU Levophed was stopped after 2 hours, 1 L LR bolus was also given, patient was mentally altered.  Broad-spectrum antibiotic was started broad-spectrum antibiotic was started Zosyn).  Later patient was transitioned to 3 L via nasal cannula oxygen saturating 100%,.  But she remained hypotensive and tachycardic. Echo showed EF 20-25%, with gloal hypokinesis. According to cardiology, she was started on 250 mL LR over 4 hours, her metoprolol 50 mg twice daily changed to 25 mg daily for sustained tachycardia, can be given if SBP comes to 100.  Palliative was consulted.  After long discussion, patient's daughter who is  also the POA, decided to change the CODE STATUS from DNR/DNI to DNRCC and requested hospice consult.  Possible hospice meeting tomorrow .  We we will still continue the IV antibiotic until further decision is made.  Hospice meeting was held near about 1300 on 6/27/2024 and decision was made to transfer the patient to [facility] facility.  Patient is discharged to [facility].

## 2024-06-25 NOTE — PROGRESS NOTES
Internal Medicine - Daily Progress Note   Hospital Day: 2       Name:Erick Muhammad, AGE: 79 y.o., GENDER: female, MRN: [id number], ROOM: 243/243-A   CODE STATUS: DNR and No Intubation  Attending Physician: Ronald Moreland DO  Resident: Mary Carr MD        Chief Complaint     Chief Complaint   Patient presents with    Shortness of Breath        Subjective    Erick Muhammad is a 79 y.o. year old female patient on Hospital Day: 2  presenting with Acute on chronic congestive heart failure, unspecified heart failure type (Multi).    Overnight events:  - No acute events overnight  - Patient seen and examined at bedside today.  Had acute worsening of mental status.  Was seen yesterday to be ANO x 4 but this morning patient was significantly altered from that baseline.  Was mumbling random stuff.  Was unsure where she is or how she got there.  Was refusing all the meds and refusing to let examiner examine her.  Vitamin D stable with no episodes of fevers or tachycardia    Meds    Scheduled medications  allopurinol, 200 mg, oral, Daily  azithromycin, 500 mg, intravenous, q24h  cefTRIAXone, 1 g, intravenous, q24h  cholecalciferol, 2,000 Units, oral, Daily  furosemide, 40 mg, intravenous, Once  [START ON 6/26/2024] furosemide, 40 mg, intravenous, BID  insulin lispro, 0-5 Units, subcutaneous, TID  levothyroxine, 112 mcg, oral, Daily before breakfast  magnesium oxide, 400 mg, oral, Daily  metoprolol succinate XL, 50 mg, oral, BID  oxybutynin, 5 mg, oral, TID  oxygen, , inhalation, Continuous - Inhalation  pantoprazole, 40 mg, oral, Daily before breakfast  polyethylene glycol, 17 g, oral, Daily  potassium chloride CR, 40 mEq, oral, BID  [Held by provider] torsemide, 60 mg, oral, BID  upadacitinib ER, 15 mg, oral, Daily      Continuous medications     PRN medications  PRN medications: acetaminophen **OR** acetaminophen **OR** acetaminophen, dextrose, dextrose, glucagon, glucagon     Objective    Physical Exam  Constitutional:  "      General: She is in acute distress.   Cardiovascular:      Comments: deferred  Pulmonary:      Comments: deferred  Abdominal:      Comments: deferred   Neurological:      Mental Status: She is disoriented.      Visit Vitals  /83 (BP Location: Left arm, Patient Position: Lying)   Pulse 95   Temp 36.1 °C (97 °F)   Resp 25   Ht 1.422 m (4' 8\")   Wt 69.5 kg (153 lb 3.5 oz)   SpO2 97%   BMI 34.35 kg/m²   OB Status Postmenopausal   Smoking Status Never   BSA 1.66 m²        Intake/Output Summary (Last 24 hours) at 6/25/2024 1450  Last data filed at 6/25/2024 1238  Gross per 24 hour   Intake 900 ml   Output 140 ml   Net 760 ml       Labs:   Results from last 72 hours   Lab Units 06/25/24  0553 06/24/24  1600   SODIUM mmol/L 135* 135*   POTASSIUM mmol/L 3.5 4.1   CHLORIDE mmol/L 91* 90*   CO2 mmol/L 32 33*   BUN mg/dL 51* 51*   CREATININE mg/dL 1.80* 1.74*   GLUCOSE mg/dL 162* 179*   CALCIUM mg/dL 9.1 10.2   ANION GAP mmol/L 16 16   EGFR mL/min/1.73m*2 28* 30*   PHOSPHORUS mg/dL 4.7  --       Results from last 72 hours   Lab Units 06/25/24  0553 06/24/24  1600   WBC AUTO x10*3/uL 22.9* 10.3   HEMOGLOBIN g/dL 11.8* 13.7   HEMATOCRIT % 36.3 40.4   PLATELETS AUTO x10*3/uL 400 504*   NEUTROS PCT AUTO %  --  81.5   LYMPHS PCT AUTO %  --  7.6   MONOS PCT AUTO %  --  7.1   EOS PCT AUTO %  --  0.2      Lab Results   Component Value Date    CALCIUM 9.1 06/25/2024    PHOS 4.7 06/25/2024      Lab Results   Component Value Date    CRP 0.54 06/24/2024     Images    CT chest abdomen pelvis wo IV contrast  Result Date: 6/25/2024  Cardiomegaly, pulmonary edema, and small right pleural effusion.   Left basilar opacities suggestive of pneumonia.   Moderate compression fracture T12 vertebral body, age indeterminate but new from 2013. Osteopenia.   Signed by: Nas Townsend 6/25/2024 12:53 PM Dictation workstation:   [id number]    CT head wo IV contrast  Result Date: 6/25/2024  No acute intracranial abnormality. Consider " follow-up with MRI as warranted.     Signed by: Nas Townsend 6/25/2024 12:47 PM Dictation workstation:   [id number]    ECG 12 lead  Result Date: 6/25/2024  Atrial fibrillation with premature ventricular or aberrantly conducted complexes Left bundle branch block Abnormal ECG When compared with ECG of 13-JUN-2024 17:27, QRS axis Shifted right    XR chest 2 views  Result Date: 6/24/2024  Mild vascular congestion without overt edema. Signed by Tano Slade MD    ECG 12 lead  Result Date: 6/20/2024  Atrial fibrillation Left bundle branch block Abnormal ECG When compared with ECG of 28-NOV-2020 14:14, Atrial fibrillation has replaced Sinus rhythm Vent. rate has increased BY  47 BPM QT has lengthened See ED provider note for full interpretation and clinical correlation Confirmed by Sasha Barajas ([id number]) on 6/20/2024 5:18:12 PM    Vascular US Lower Extremity Venous Duplex Left  Result Date: 6/13/2024  No deep venous thrombosis of the  left lower extremity.   MACRO: None   Signed by: Dami Strickland 6/13/2024 6:42 PM Dictation workstation:   [id number]    XR ankle left 3+ views  Result Date: 6/13/2024  Soft tissue swelling and ossification along the medial malleolus. Clinical correlation with point tenderness is suggested.   Demineralization and degenerative changes.   MACRO: None   Signed by: Mray Mccallum 6/13/2024 6:06 PM Dictation workstation:   [id number]    XR chest 1 view  Result Date: 6/13/2024  1.  Enlarged cardiac silhouette without definite focal consolidation within constraints of imaging. Pulmonary vascular congestion not excluded.       MACRO: None   Signed by: Mary Mccallum 6/13/2024 6:04 PM Dictation workstation:   [id number]      Assessment and Plan    Erick Muhammad is a 79 y.o. female admitted on 6/24/2024 for acute shortness of breath and chest pain accompanied with nausea.  Past medical history significant for CKD (baseline GFR 41 to 45%, baseline creatinine 1.21 to 1.26), hypothyroidism,  chronic anemia, HFpEF (EF 55-60% from 01/20/2024), rheumatoid arthritis, gout, LUCY (does not use CPAP), prediabetes (last HbA1c from 10/31/2023 at 5.7), vitamin D deficiency, recurrent UTIs (on daily cephalexin prophylaxis).  Was admitted for acute hypoxic respiratory failure secondary to acute exacerbation of congestive heart failure.  Patient developed acute worsening of mental status this morning.  Was seen confused, agitated and trying to pull lines out.    ACUTE MEDICAL ISSUES:  # Acute Encephalopathy 2/2 Metabolic vs Infectious  # UTI  # PNA  - New in onset, seen this morning  - Labs available: ABG: pH 7.34, pCO2 65, pO2 93. Lactate 1.2 . Flu A, B, RSV and Covid 19 PCR negative, ESR 6, CRP 0.54, ammonia levels 34, Cr 1.8 --> 1.74, WBC 22.9, UA with 500 leukocyte esterase, RBC 6 - 10, WBC 6 - 10  - Labs ordered: Vitamin B12, TSH, Folate, procal, Blood cultures , urine cultures  - EKG reviewed showed rate 96 bpm, irregular rhythm, no st- elevations or depressions, A. Fib and LBBB  - Imaging: CXR with  Enlarged cardiac silhouette without definite focal consolidation within constraints of imaging. Pulmonary vascular congestion not excluded.   - Ordered CT head today 2/2 AMS: No acute intracranial abnormality. Ordered chest abdomen pelvis wo IV contrast to look for possible course: Left basilar opacities suggestive of pneumonia   - Tx: Started patient today on azithromycin 500 mg every day (06/25/2024 to 6/27/2024) ceftriaxone 1 g every 24 hours for 5 days (06/25/2024 to 06/29/2024)  - Supportive: Delirium precautions, supplemental oxygen to maintain SpO2 greater than 90%, DuoNeb nebulization, IS + Acapella     # Acute hypoxic respiratory failure secondary to acute on chronic congestive heart failure  # HFpEF   - Labs available: BNP elevated at 2014. Trop I 82 --> 67  - Imaging: CXR shows Lungs are clear.  Mild vascular congestion without overt edema. CT chest: Cardiomegaly, pulmonary edema, and small right pleural  effusion   - Recent Echo from 01/20/2021 with LVEF 55 to 60%, impaired relaxation pattern of left ventricular diastolic filling, left atrium mild to moderately dilated, mild to moderate mitral valve stenosis, moderate mitral valve calcification, aortic valve stenosis, mild TR, RVSP at 44.6  [ ] Ordered Echo; pending  - Cardio/ Dr. Webb's team consulted  - Tx: Metoprolol ER succinate 50 mg twice daily. HOLDING home torsemide 40 mg BID her hold regimen. Given Lasix 40 mg IV once. Cardio recommendsIV lasix 40 mg BID starting tomorrow  - Supportive: On Tele, K >4 and Mg >2, supplemental oxygen to maintain SpO2 greater than 90%.  Pain control with Tylenol 650 mg every 6 hours as needed    # REN on CKD  - Baseline GFR 41 to 45%, baseline creatinine 1.21 to 1.26.  Creatinine elevated at admission to 1.7 with GFR 30  : : Likely cardiorenal in origin vs. Meds induced  - FeNa: 0.5% - Pre-renal  - Imaging ordered: Kidney ultrasound  - Supportive: Avoid nephrotoxic medication administration (e.g. NSAID's, aminoglycosides, beta-lactams, quinolones, rifampin, sulfonamides, vancomycin, acyclovir, contrast agents). Daily RFPs    # Chronic Hypercapnic Respiratory failure  # LUCY  - VBG from yesterday showed pCO2 at 73.  Repeat ABG today on 06/25/2024 seen to have pCO2 of 65  - Per daughter patient was advised to use oxygen at baseline, also advised to use CPAP which patient is noncompliant      # A. Fib:  - Currently on rate control with metoprolol XL 50 mg twice daily  - Currently holding anticoagulation due to elevated supratherapeutic PT/INR.  - Trend PT/INR daily     # NIDDM-II:  - Currently not on any treatment.  Last HbA1c from 10/31/2023 at 5.7   - Ordered mild SSI #1 with AC plus at bedtime Accu-Cheks.  Hypoglycemia protocol in place    CHRONIC MEDICAL ISSUES:  # LUCY: Not on home CPAP  # Recurrent UTIs: Continue cephalexin 250 mg every day  # RA: Follows with Dr. Major. Cont Rinvoq 15mg every day. Patient to bring from  their home as we do not carry inpatient  # Gout: Continue allopurinol 200 mg every day  # Vitamin D deficiency: Continue home vitamin D 2000 units every day  # Hypothyroidism: Continue home levothyroxine 112 mcg every day   # Urge continence: Switched home oxybutynin XL 15 mg to oxybutynin 5 mg 3 times daily due to formulary availability.   # Constipation: On MiraLAX 17 g every day  # GERD: Switched home omeprazole 40 mg every day to pantoprazole 40 mg every day     Fluids: 1500 cc fluid restriction  Electrolytes: Replete to keep potassium greater than 4 and magnesium greater than 2  Nutrition: Adult cardiac diet  Antimicrobials: None indicated  DVT ppx: Holding secondary to elevated PT/INR  GI ppx: Pantoprazole 40 mg every day  Catheter: External female catheter  Lines: 20-gauge PIV in left proximal ventral forearm  Supplemental Oxygen: On 2 L nasal cannula  Emergency Contact: Extended Emergency Contact Information  Primary Emergency Contact: Rik Frederickll  Home Phone: [phone]  Mobile Phone: [phone]  Relation: Child   Code: DNR and No Intubation     Disposition: New onset worsening mentation.  Patient refused to work with OT.  Currently has bilateral upper extremity restraints.  Continue to monitor inpatient    Mary Carr MD  Internal Medicine, PGY- 1  06/25/24 at 2:50 PM     Disclaimer: Documentation completed with the information available at the time of input. The times in the chart may not be reflective of actual patient care times, interventions, or procedures. Documentation occurs after the physical care of the patient. This note was dictated by speech recognition. Minor errors in transcription may be present

## 2024-06-25 NOTE — CONSULTS
Inpatient consult to Cardiology  Consult performed by: Marlene Carlin, REBEKAH-CNP  Consult ordered by: Julia Mccoy MD  Reason for consult: chf          History Of Present Illness  Erick Muhammad is a 79 y.o. female is a poor historian and lethargic upon exam. According to the medical records, she was brought in due to shortness of breath and fatigue.     Lab work in the ER showed glucose 179, sodium 135, potassium 4.1, BUN/creatinine 51/1.74, magnesium 1.9, BNP 2014, troponin 82, 67, INR 7.8, WBCs 10.3, H&H 13.7/40.4, chest x-ray showed mild vascular congestion without overt edema, UA showed possible UTI.    EKG showed atrial fibrillation with known LBBB.    She was given IV Lasix and started on antibiotics.      Past Medical History  Past Medical History:   Diagnosis Date    Achilles tendinitis, unspecified leg 05/19/2014    Achilles tendinitis    Acute kidney failure, unspecified ([facility]) 11/20/2020    REN (acute kidney injury)    Benign paroxysmal vertigo, unspecified ear 01/04/2017    BPPV (benign paroxysmal positional vertigo)    Bitten or stung by nonvenomous insect and other nonvenomous arthropods, initial encounter 07/23/2019    Insect bite, unspecified site, initial encounter    Cellulitis of face 01/17/2017    Cellulitis and abscess of face    Chronic systolic (congestive) heart failure (Multi) 09/11/2021    Chronic systolic congestive heart failure    Diarrhea, unspecified 01/04/2019    Acute diarrhea    Impacted cerumen, bilateral 04/24/2019    Impacted cerumen of both ears    Lesion of ulnar nerve, unspecified upper limb 05/19/2014    Cubital tunnel syndrome    Localized edema 07/29/2019    Edema of both legs    Other acute sinusitis 07/03/2019    Other acute sinusitis, recurrence not specified    Other conditions influencing health status     Osteoarthritis    Other microscopic hematuria 06/01/2017    Hematuria, microscopic    Other specified symptoms and signs involving the circulatory and  respiratory systems 12/15/2021    Choking episode    Personal history of diseases of the blood and blood-forming organs and certain disorders involving the immune mechanism 01/24/2020    History of thrombocytopenia    Personal history of diseases of the blood and blood-forming organs and certain disorders involving the immune mechanism     History of iron deficiency anemia    Personal history of other diseases of the circulatory system 03/31/2020    History of intermittent claudication    Personal history of other diseases of the circulatory system     History of hypertension    Personal history of other diseases of the respiratory system 03/27/2017    History of acute sinusitis    Personal history of other endocrine, nutritional and metabolic disease     History of hypothyroidism    Personal history of other specified conditions 12/22/2021    History of hoarseness    Personal history of other specified conditions 07/23/2019    History of dysuria    Personal history of pneumonia (recurrent) 02/10/2020    History of community acquired pneumonia    Personal history of urinary (tract) infections 06/06/2018    History of urinary tract infection    Rheumatoid arthritis, unspecified (Multi)     Rheumatoid arthritis    Trochanteric bursitis, left hip 09/19/2016    Greater trochanteric bursitis of left hip    Unspecified acute conjunctivitis, bilateral 01/16/2017    Acute conjunctivitis of both eyes, unspecified acute conjunctivitis type    Urinary tract infection, site not specified 07/03/2019    Acute lower UTI       Surgical History  Past Surgical History:   Procedure Laterality Date    CHOLECYSTECTOMY  01/04/2017    Cholecystectomy    DILATION AND CURETTAGE OF UTERUS  01/12/2017    Dilation And Curettage    ROTATOR CUFF REPAIR  01/04/2017    Rotator Cuff Repair    TOTAL ABDOMINAL HYSTERECTOMY  01/12/2017    Total Abdominal Hysterectomy    TOTAL KNEE ARTHROPLASTY  01/04/2017    Total Knee Arthroplasty        Social  History  She reports that she has never smoked. She has never used smokeless tobacco. She reports that she does not drink alcohol and does not use drugs.    Family History  Family History   Problem Relation Name Age of Onset    Coronary artery disease Mother      Arthritis Mother      Other (cardiac disorder) Mother      Hypertension Mother          essential    Heart attack Mother      Anxiety disorder Father      Depression Father      Arthritis Father      Blindness Father          or visual loss    Stroke Father      Deafness Father          or hearing loss    Hypertension Father          essential    Hyperlipidemia Father      Heart attack Father      Diabetes type II Father      Asthma Sister      Diabetes Sister      Hypertension Sister      Kidney disease Sister      Alcohol abuse Brother      Deep vein thrombosis Brother      Anxiety disorder Maternal Grandmother      Depression Maternal Grandmother      Arthritis Maternal Grandmother      Deafness Maternal Grandmother          or hearing loss    Diabetes Maternal Grandmother      Hypertension Maternal Grandmother      Heart attack Maternal Grandmother      Asthma Maternal Grandfather      Heart attack Maternal Grandfather      Asthma Paternal Grandmother      Heart attack Paternal Grandmother      Anxiety disorder Paternal Grandfather      Depression Paternal Grandfather      Asthma Paternal Grandfather      Deafness Paternal Grandfather          or hearing loss    Heart attack Paternal Grandfather          Allergies  Carvedilol, Lisinopril, Metoprolol, Milk containing products (dairy), Morphine, Nitrofurantoin monohyd/m-cryst, and Trazodone    Review of Systems  Review of Systems   Unable to perform ROS: Mental status change          Physical Exam  Constitutional: Well developed, lethargic, no distress,   Eyes: PERRL, clear sclera  ENMT: mucous membranes moist, no apparent injury, no lesions seen  Head/Neck: Neck supple, no apparent injury, thyroid  "without mass or tenderness, No JVD, trachea midline, no bruits  Respiratory/Thorax: Patent airways, rhonchi throughout with good chest expansion, thorax symmetric  Cardiovascular: irregular rhythm, no murmurs, 2+ equal pulses of the extremities, normal S 1and S 2  Gastrointestinal: Nondistended, soft, non-tender, no rebound tenderness or guarding, no masses palpable, no organomegaly, +BS, no bruits  Musculoskeletal: lethargic  Extremities: no edema  Neurological: lethargic  Lymphatic: No significant lymphadenopathy  Skin: pale, Warm and dry, no lesions, no rashes       Last Recorded Vitals  Blood pressure 105/83, pulse 95, temperature 36.1 °C (97 °F), resp. rate 25, height 1.422 m (4' 8\"), weight 69.5 kg (153 lb 3.5 oz), SpO2 97%.    Relevant Results    Scheduled medications  allopurinol, 200 mg, oral, Daily  azithromycin, 500 mg, intravenous, q24h  cefTRIAXone, 1 g, intravenous, q24h  cholecalciferol, 2,000 Units, oral, Daily  insulin lispro, 0-5 Units, subcutaneous, TID  levothyroxine, 112 mcg, oral, Daily before breakfast  magnesium oxide, 400 mg, oral, Daily  metoprolol succinate XL, 50 mg, oral, BID  oxybutynin, 5 mg, oral, TID  oxygen, , inhalation, Continuous - Inhalation  pantoprazole, 40 mg, oral, Daily before breakfast  polyethylene glycol, 17 g, oral, Daily  potassium chloride CR, 40 mEq, oral, BID  torsemide, 60 mg, oral, BID  upadacitinib ER, 15 mg, oral, Daily      Continuous medications     PRN medications  PRN medications: acetaminophen **OR** acetaminophen **OR** acetaminophen, dextrose, dextrose, glucagon, glucagon    Results for orders placed or performed during the hospital encounter of 06/24/24 (from the past 24 hour(s))   CBC and Auto Differential   Result Value Ref Range    WBC 10.3 4.4 - 11.3 x10*3/uL    nRBC 0.4 (H) 0.0 - 0.0 /100 WBCs    RBC 4.31 4.00 - 5.20 x10*6/uL    Hemoglobin 13.7 12.0 - 16.0 g/dL    Hematocrit 40.4 36.0 - 46.0 %    MCV 94 80 - 100 fL    MCH 31.8 26.0 - 34.0 pg    MCHC " 33.9 32.0 - 36.0 g/dL    RDW 18.8 (H) 11.5 - 14.5 %    Platelets 504 (H) 150 - 450 x10*3/uL    Neutrophils % 81.5 40.0 - 80.0 %    Immature Granulocytes %, Automated 2.8 (H) 0.0 - 0.9 %    Lymphocytes % 7.6 13.0 - 44.0 %    Monocytes % 7.1 2.0 - 10.0 %    Eosinophils % 0.2 0.0 - 6.0 %    Basophils % 0.8 0.0 - 2.0 %    Neutrophils Absolute 8.37 (H) 1.60 - 5.50 x10*3/uL    Immature Granulocytes Absolute, Automated 0.29 0.00 - 0.50 x10*3/uL    Lymphocytes Absolute 0.78 (L) 0.80 - 3.00 x10*3/uL    Monocytes Absolute 0.73 0.05 - 0.80 x10*3/uL    Eosinophils Absolute 0.02 0.00 - 0.40 x10*3/uL    Basophils Absolute 0.08 0.00 - 0.10 x10*3/uL   Comprehensive Metabolic Panel   Result Value Ref Range    Glucose 179 (H) 74 - 99 mg/dL    Sodium 135 (L) 136 - 145 mmol/L    Potassium 4.1 3.5 - 5.3 mmol/L    Chloride 90 (L) 98 - 107 mmol/L    Bicarbonate 33 (H) 21 - 32 mmol/L    Anion Gap 16 10 - 20 mmol/L    Urea Nitrogen 51 (H) 6 - 23 mg/dL    Creatinine 1.74 (H) 0.50 - 1.05 mg/dL    eGFR 30 (L) >60 mL/min/1.73m*2    Calcium 10.2 8.6 - 10.3 mg/dL    Albumin 4.8 3.4 - 5.0 g/dL    Alkaline Phosphatase 109 33 - 136 U/L    Total Protein 8.2 6.4 - 8.2 g/dL    AST 43 (H) 9 - 39 U/L    Bilirubin, Total 1.1 0.0 - 1.2 mg/dL    ALT 39 7 - 45 U/L   Magnesium   Result Value Ref Range    Magnesium 1.92 1.60 - 2.40 mg/dL   Troponin I, High Sensitivity, Initial   Result Value Ref Range    Troponin I, High Sensitivity 82 (HH) 0 - 13 ng/L   B-type natriuretic peptide   Result Value Ref Range    BNP 2,014 (H) 0 - 99 pg/mL   ECG 12 lead   Result Value Ref Range    Ventricular Rate 91 BPM    QRS Duration 148 ms    QT Interval 408 ms    QTC Calculation(Bazett) 501 ms    R Axis 38 degrees    T Axis 154 degrees    QRS Count 15 beats    Q Onset 209 ms    T Offset 413 ms    QTC Fredericia 469 ms   Troponin, High Sensitivity, 1 Hour   Result Value Ref Range    Troponin I, High Sensitivity 67 (HH) 0 - 13 ng/L   Protime-INR   Result Value Ref Range     Protime 89.7 (HH) 9.8 - 12.8 seconds    INR 7.8 (HH) 0.9 - 1.1   BLOOD GAS VENOUS FULL PANEL   Result Value Ref Range    POCT pH, Venous 7.37 7.33 - 7.43 pH    POCT pCO2, Venous 73 (HH) 41 - 51 mm Hg    POCT pO2, Venous 45 35 - 45 mm Hg    POCT SO2, Venous 66 45 - 75 %    POCT Oxy Hemoglobin, Venous 63.3 45.0 - 75.0 %    POCT Hematocrit Calculated, Venous 37.0 36.0 - 46.0 %    POCT Sodium, Venous 135 (L) 136 - 145 mmol/L    POCT Potassium, Venous 3.6 3.5 - 5.3 mmol/L    POCT Chloride, Venous 94 (L) 98 - 107 mmol/L    POCT Ionized Calicum, Venous 1.23 1.10 - 1.33 mmol/L    POCT Glucose, Venous 186 (H) 74 - 99 mg/dL    POCT Lactate, Venous 1.4 0.4 - 2.0 mmol/L    POCT Base Excess, Venous 13.8 (H) -2.0 - 3.0 mmol/L    POCT HCO3 Calculated, Venous 42.2 (H) 22.0 - 26.0 mmol/L    POCT Hemoglobin, Venous 12.2 12.0 - 16.0 g/dL    POCT Anion Gap, Venous 2.0 (L) 10.0 - 25.0 mmol/L    Patient Temperature      FiO2 28 %   C-reactive protein   Result Value Ref Range    C-Reactive Protein 0.54 <1.00 mg/dL   Influenza A, and B PCR   Result Value Ref Range    Flu A Result Not Detected Not Detected    Flu B Result Not Detected Not Detected   Sars-CoV-2 PCR   Result Value Ref Range    Coronavirus 2019, PCR Not Detected Not Detected   POCT GLUCOSE   Result Value Ref Range    POCT Glucose 152 (H) 74 - 99 mg/dL   Urinalysis with Reflex Microscopic   Result Value Ref Range    Color, Urine Light-Yellow Light-Yellow, Yellow, Dark-Yellow    Appearance, Urine Turbid (N) Clear    Specific Gravity, Urine 1.011 1.005 - 1.035    pH, Urine 5.5 5.0, 5.5, 6.0, 6.5, 7.0, 7.5, 8.0    Protein, Urine NEGATIVE NEGATIVE, 10 (TRACE), 20 (TRACE) mg/dL    Glucose, Urine Normal Normal mg/dL    Blood, Urine NEGATIVE NEGATIVE    Ketones, Urine NEGATIVE NEGATIVE mg/dL    Bilirubin, Urine NEGATIVE NEGATIVE    Urobilinogen, Urine Normal Normal mg/dL    Nitrite, Urine NEGATIVE NEGATIVE    Leukocyte Esterase, Urine 500 Ronda/µL (A) NEGATIVE   Urine electrolytes    Result Value Ref Range    Sodium, Urine Random 25 mmol/L    Sodium/Creatinine Ratio 36 Not established. mmol/g Creat    Potassium, Urine Random 18 mmol/L    Potassium/Creatinine Ratio 26 Not established mmol/g Creat    Chloride, Urine Random 28 mmol/L    Chloride/Creatinine Ratio 40 38 - 318 mmol/g creat    Creatinine, Urine Random 70.1 20.0 - 320.0 mg/dL   Sodium, Urine Random   Result Value Ref Range    Sodium, Urine Random 25 mmol/L    Creatinine, Urine Random 70.1 20.0 - 320.0 mg/dL    Sodium/Creatinine Ratio 36 Not established. mmol/g Creat   Microscopic Only, Urine   Result Value Ref Range    WBC, Urine 6-10 (A) 1-5, NONE /HPF    RBC, Urine 6-10 (A) NONE, 1-2, 3-5 /HPF    Squamous Epithelial Cells, Urine 10-25 (FEW) Reference range not established. /HPF    Mucus, Urine FEW Reference range not established. /LPF    Hyaline Casts, Urine 3+ (A) NONE /LPF   CBC   Result Value Ref Range    WBC 22.9 (H) 4.4 - 11.3 x10*3/uL    nRBC 0.2 (H) 0.0 - 0.0 /100 WBCs    RBC 3.77 (L) 4.00 - 5.20 x10*6/uL    Hemoglobin 11.8 (L) 12.0 - 16.0 g/dL    Hematocrit 36.3 36.0 - 46.0 %    MCV 96 80 - 100 fL    MCH 31.3 26.0 - 34.0 pg    MCHC 32.5 32.0 - 36.0 g/dL    RDW 18.6 (H) 11.5 - 14.5 %    Platelets 400 150 - 450 x10*3/uL   Magnesium   Result Value Ref Range    Magnesium 1.82 1.60 - 2.40 mg/dL   Comprehensive Metabolic Panel   Result Value Ref Range    Glucose 162 (H) 74 - 99 mg/dL    Sodium 135 (L) 136 - 145 mmol/L    Potassium 3.5 3.5 - 5.3 mmol/L    Chloride 91 (L) 98 - 107 mmol/L    Bicarbonate 32 21 - 32 mmol/L    Anion Gap 16 10 - 20 mmol/L    Urea Nitrogen 51 (H) 6 - 23 mg/dL    Creatinine 1.80 (H) 0.50 - 1.05 mg/dL    eGFR 28 (L) >60 mL/min/1.73m*2    Calcium 9.1 8.6 - 10.3 mg/dL    Albumin 3.8 3.4 - 5.0 g/dL    Alkaline Phosphatase 82 33 - 136 U/L    Total Protein 6.1 (L) 6.4 - 8.2 g/dL    AST 25 9 - 39 U/L    Bilirubin, Total 1.1 0.0 - 1.2 mg/dL    ALT 28 7 - 45 U/L   Phosphorus   Result Value Ref Range    Phosphorus  4.7 2.5 - 4.9 mg/dL   Sedimentation Rate   Result Value Ref Range    Sedimentation Rate 6 0 - 30 mm/h   Protime-INR   Result Value Ref Range    Protime 82.7 (HH) 9.8 - 12.8 seconds    INR 7.2 (HH) 0.9 - 1.1   POCT GLUCOSE   Result Value Ref Range    POCT Glucose 183 (H) 74 - 99 mg/dL   POCT GLUCOSE   Result Value Ref Range    POCT Glucose 186 (H) 74 - 99 mg/dL   Ammonia   Result Value Ref Range    Ammonia 34 16 - 53 umol/L   Blood Gas Arterial Full Panel   Result Value Ref Range    POCT pH, Arterial 7.34 (L) 7.38 - 7.42 pH    POCT pCO2, Arterial 65 (H) 38 - 42 mm Hg    POCT pO2, Arterial 93 85 - 95 mm Hg    POCT SO2, Arterial 99 94 - 100 %    POCT Oxy Hemoglobin, Arterial 96.2 94.0 - 98.0 %    POCT Hematocrit Calculated, Arterial 35.0 (L) 36.0 - 46.0 %    POCT Sodium, Arterial 131 (L) 136 - 145 mmol/L    POCT Potassium, Arterial 3.9 3.5 - 5.3 mmol/L    POCT Chloride, Arterial 92 (L) 98 - 107 mmol/L    POCT Ionized Calcium, Arterial 1.17 1.10 - 1.33 mmol/L    POCT Glucose, Arterial 193 (H) 74 - 99 mg/dL    POCT Lactate, Arterial 1.2 0.4 - 2.0 mmol/L    POCT Base Excess, Arterial 7.4 (H) -2.0 - 3.0 mmol/L    POCT HCO3 Calculated, Arterial 35.1 (H) 22.0 - 26.0 mmol/L    POCT Hemoglobin, Arterial 11.7 (L) 12.0 - 16.0 g/dL    POCT Anion Gap, Arterial 8 (L) 10 - 25 mmo/L    Patient Temperature      FiO2 28 %    Site of Arterial Puncture Radial Left     Brandon's Test Positive        CT head wo IV contrast   Final Result   No acute intracranial abnormality. Consider follow-up with MRI as   warranted.             Signed by: Nas Townsend 6/25/2024 12:47 PM   Dictation workstation:   [id number]      CT chest abdomen pelvis wo IV contrast   Final Result   Cardiomegaly, pulmonary edema, and small right pleural effusion.        Left basilar opacities suggestive of pneumonia.        Moderate compression fracture T12 vertebral body, age indeterminate   but new from 2013. Osteopenia.        Signed by: Nas Townsend 6/25/2024  12:53 PM   Dictation workstation:   [id number]      XR chest 2 views   Final Result   Mild vascular congestion without overt edema.   Signed by Tano Slade MD      Transthoracic Echo (TTE) Complete    (Results Pending)   US renal complete    (Results Pending)       No echocardiogram results found for the past 12 months       Assessment/Plan   Echocardiogram from 2022, LVEF 50 to 55%, LVH with diastolic dysfunction, left atrial enlargement, moderate AAS, heavily calcified mitral valve annulus, mild mitral stenosis, mild to moderate MR/TR, moderate pulmonary hypertension    Acute on chronic systolic and diastolic CHF  -BNP 2014  -CXR showed mild vascular congestion  -I reviewed the Echocardiogram as above  -Repeat echo  -Strict I & Os  -Daily weights  -2gm na diet  -1500mL fluid restriction  -Hold torsemide  -received a dose of lasix in ER  -Give another dose of IV lasix now and then start 40mg IV BID    2. Pneumonia  -Procal pending  -WBC elevated   -CT chest showed pulmonary edema and pneumonia  -antibiotics  -bronchodilators  -oxygen support  -sputum culture  -blood cultures    3. Paroxysmal atrial fibrillation  -Recently diagnosed  -On coumadin->INR 7.8->7.2  -Hold coumadin  -Monitor INR  -No signs of bleeding  -Cont metoprolol for rate control  -Monitor on tele    4. Elevated troponin-Non MI elevation 2/2 CHF, pneumonia  -No reports of chest pain  -EKG showed AF with chronic LBBB  -Troponin downtrending    5. Acute kidney injury  -Creatinine up to 1.8  -Monitor    6. Hypertension  -stable  -2gm na diet  -cont meds  -monitor    7. Diabetes mellitus  -ISS  -Accuchecks      REBEKAH Villa-CNP

## 2024-06-25 NOTE — PROGRESS NOTES
06/25/24 1405   Discharge Planning   Living Arrangements Alone   Support Systems Children;Family members;Friends/neighbors   Assistance Needed patient currently A&Ox1, unable to answer interview questions. Information obtained from patient's daughter, Eliana. Patient is normally A&Ox2-3, independent with ADLs, uses a walker or cane, drives, room air at baseline (refuses to wear oxygen per daughter) PCP Dr. Cornelio Peck   Type of Residence Private residence   Home or Post Acute Services Post acute facilities (Rehab/SNF/etc)   Type of Post Acute Facility Services Skilled nursing   Patient expects to be discharged to: PT/OT chelle pending, anticipate patient will need SNF   Does the patient need discharge transport arranged? Yes   RoundTrip coordination needed? Yes   Has discharge transport been arranged? No

## 2024-06-25 NOTE — PROGRESS NOTES
Occupational Therapy                 Therapy Communication Note    Patient Name: Erick Muhammad  MRN: [id number]  Today's Date: 6/25/2024     Discipline: Occupational Therapy    Missed Visit Reason: Missed Visit Reason: Patient placed on medical hold (08:42, pt placed on medical hold for elevated INR of 7.2. Additionally, pt is currently confused and B UE wrsit restraints to maintain safety. No OT eval completed)    Missed Time: Attempt

## 2024-06-26 ENCOUNTER — APPOINTMENT (OUTPATIENT)
Dept: CARDIOLOGY | Facility: HOSPITAL | Age: 79
End: 2024-06-26
Payer: COMMERCIAL

## 2024-06-26 LAB
ALBUMIN SERPL BCP-MCNC: 3.6 G/DL (ref 3.4–5)
ALP SERPL-CCNC: 93 U/L (ref 33–136)
ALT SERPL W P-5'-P-CCNC: 32 U/L (ref 7–45)
ANION GAP BLDV CALCULATED.4IONS-SCNC: 11 MMOL/L (ref 10–25)
ANION GAP BLDV CALCULATED.4IONS-SCNC: 15 MMOL/L (ref 10–25)
ANION GAP SERPL CALC-SCNC: 15 MMOL/L (ref 10–20)
AORTIC VALVE MEAN GRADIENT: 6.4 MMHG
AORTIC VALVE PEAK VELOCITY: 1.64 M/S
AST SERPL W P-5'-P-CCNC: 30 U/L (ref 9–39)
ATRIAL RATE: 105 BPM
ATRIAL RATE: 115 BPM
AV PEAK GRADIENT: 10.7 MMHG
AVA (PEAK VEL): 1.29 CM2
AVA (VTI): 1.59 CM2
BASE EXCESS BLDV CALC-SCNC: 5 MMOL/L (ref -2–3)
BASE EXCESS BLDV CALC-SCNC: 6.5 MMOL/L (ref -2–3)
BASOPHILS # BLD AUTO: 0.04 X10*3/UL (ref 0–0.1)
BASOPHILS NFR BLD AUTO: 0.3 %
BILIRUB SERPL-MCNC: 0.9 MG/DL (ref 0–1.2)
BODY TEMPERATURE: ABNORMAL
BODY TEMPERATURE: ABNORMAL
BUN SERPL-MCNC: 52 MG/DL (ref 6–23)
CA-I BLDV-SCNC: 1.18 MMOL/L (ref 1.1–1.33)
CA-I BLDV-SCNC: 1.19 MMOL/L (ref 1.1–1.33)
CALCIUM SERPL-MCNC: 8.9 MG/DL (ref 8.6–10.3)
CARDIAC TROPONIN I PNL SERPL HS: 65 NG/L (ref 0–13)
CHLORIDE BLDV-SCNC: 91 MMOL/L (ref 98–107)
CHLORIDE BLDV-SCNC: 92 MMOL/L (ref 98–107)
CHLORIDE SERPL-SCNC: 93 MMOL/L (ref 98–107)
CO2 SERPL-SCNC: 32 MMOL/L (ref 21–32)
CREAT SERPL-MCNC: 1.83 MG/DL (ref 0.5–1.05)
EGFRCR SERPLBLD CKD-EPI 2021: 28 ML/MIN/1.73M*2
EJECTION FRACTION APICAL 4 CHAMBER: 21.6
EJECTION FRACTION: 23 %
EOSINOPHIL # BLD AUTO: 0.03 X10*3/UL (ref 0–0.4)
EOSINOPHIL NFR BLD AUTO: 0.2 %
ERYTHROCYTE [DISTWIDTH] IN BLOOD BY AUTOMATED COUNT: 18.4 % (ref 11.5–14.5)
FOLATE SERPL-MCNC: 12.3 NG/ML
GLUCOSE BLD MANUAL STRIP-MCNC: 124 MG/DL (ref 74–99)
GLUCOSE BLD MANUAL STRIP-MCNC: 131 MG/DL (ref 74–99)
GLUCOSE BLD MANUAL STRIP-MCNC: 179 MG/DL (ref 74–99)
GLUCOSE BLDV-MCNC: 119 MG/DL (ref 74–99)
GLUCOSE BLDV-MCNC: 171 MG/DL (ref 74–99)
GLUCOSE SERPL-MCNC: 180 MG/DL (ref 74–99)
HCO3 BLDV-SCNC: 33.7 MMOL/L (ref 22–26)
HCO3 BLDV-SCNC: 33.8 MMOL/L (ref 22–26)
HCT VFR BLD AUTO: 33.8 % (ref 36–46)
HCT VFR BLD EST: 34 % (ref 36–46)
HCT VFR BLD EST: 36 % (ref 36–46)
HGB BLD-MCNC: 10.9 G/DL (ref 12–16)
HGB BLDV-MCNC: 11.4 G/DL (ref 12–16)
HGB BLDV-MCNC: 12 G/DL (ref 12–16)
IMM GRANULOCYTES # BLD AUTO: 0.18 X10*3/UL (ref 0–0.5)
IMM GRANULOCYTES NFR BLD AUTO: 1.2 % (ref 0–0.9)
INHALED O2 CONCENTRATION: 40 %
INHALED O2 CONCENTRATION: 40 %
INR PPP: ABNORMAL
LACTATE BLDV-SCNC: 1.4 MMOL/L (ref 0.4–2)
LACTATE BLDV-SCNC: 3.5 MMOL/L (ref 0.4–2)
LEFT ATRIUM VOLUME AREA LENGTH INDEX BSA: 45.3 ML/M2
LEFT VENTRICLE INTERNAL DIMENSION DIASTOLE: 5.28 CM (ref 3.5–6)
LEFT VENTRICULAR OUTFLOW TRACT DIAMETER: 1.97 CM
LEGIONELLA AG UR QL: NEGATIVE
LYMPHOCYTES # BLD AUTO: 0.56 X10*3/UL (ref 0.8–3)
LYMPHOCYTES NFR BLD AUTO: 3.6 %
MAGNESIUM SERPL-MCNC: 1.79 MG/DL (ref 1.6–2.4)
MCH RBC QN AUTO: 31.4 PG (ref 26–34)
MCHC RBC AUTO-ENTMCNC: 32.2 G/DL (ref 32–36)
MCV RBC AUTO: 97 FL (ref 80–100)
MONOCYTES # BLD AUTO: 1 X10*3/UL (ref 0.05–0.8)
MONOCYTES NFR BLD AUTO: 6.4 %
NEUTROPHILS # BLD AUTO: 13.8 X10*3/UL (ref 1.6–5.5)
NEUTROPHILS NFR BLD AUTO: 88.3 %
NRBC BLD-RTO: 0 /100 WBCS (ref 0–0)
OXYHGB MFR BLDV: 36.8 % (ref 45–75)
OXYHGB MFR BLDV: 82.4 % (ref 45–75)
PCO2 BLDV: 61 MM HG (ref 41–51)
PCO2 BLDV: 72 MM HG (ref 41–51)
PH BLDV: 7.28 PH (ref 7.33–7.43)
PH BLDV: 7.35 PH (ref 7.33–7.43)
PHOSPHATE SERPL-MCNC: 3.7 MG/DL (ref 2.5–4.9)
PLATELET # BLD AUTO: 317 X10*3/UL (ref 150–450)
PO2 BLDV: 26 MM HG (ref 35–45)
PO2 BLDV: 52 MM HG (ref 35–45)
POTASSIUM BLDV-SCNC: 4.2 MMOL/L (ref 3.5–5.3)
POTASSIUM BLDV-SCNC: 4.6 MMOL/L (ref 3.5–5.3)
POTASSIUM SERPL-SCNC: 4.1 MMOL/L (ref 3.5–5.3)
PROCALCITONIN SERPL-MCNC: 0.09 NG/ML
PROT SERPL-MCNC: 5.8 G/DL (ref 6.4–8.2)
PROTHROMBIN TIME: >100 SECONDS (ref 9.8–12.8)
Q ONSET: 213 MS
Q ONSET: 214 MS
QRS COUNT: 16 BEATS
QRS COUNT: 17 BEATS
QRS DURATION: 142 MS
QRS DURATION: 142 MS
QT INTERVAL: 380 MS
QT INTERVAL: 384 MS
QTC CALCULATION(BAZETT): 480 MS
QTC CALCULATION(BAZETT): 507 MS
QTC FREDERICIA: 444 MS
QTC FREDERICIA: 462 MS
R AXIS: 77 DEGREES
R AXIS: 84 DEGREES
RBC # BLD AUTO: 3.47 X10*6/UL (ref 4–5.2)
RIGHT VENTRICLE FREE WALL PEAK S': 6 CM/S
RIGHT VENTRICLE PEAK SYSTOLIC PRESSURE: 57.3 MMHG
S PNEUM AG UR QL: NEGATIVE
SAO2 % BLDV: 37 % (ref 45–75)
SAO2 % BLDV: 85 % (ref 45–75)
SODIUM BLDV-SCNC: 132 MMOL/L (ref 136–145)
SODIUM BLDV-SCNC: 135 MMOL/L (ref 136–145)
SODIUM SERPL-SCNC: 136 MMOL/L (ref 136–145)
T AXIS: 260 DEGREES
T AXIS: 262 DEGREES
T OFFSET: 404 MS
T OFFSET: 405 MS
T4 FREE SERPL-MCNC: 1.87 NG/DL (ref 0.61–1.12)
TRICUSPID ANNULAR PLANE SYSTOLIC EXCURSION: 1.6 CM
TSH SERPL-ACNC: 0.2 MIU/L (ref 0.44–3.98)
VENTRICULAR RATE: 105 BPM
VENTRICULAR RATE: 96 BPM
VIT B12 SERPL-MCNC: 879 PG/ML (ref 211–911)
WBC # BLD AUTO: 15.6 X10*3/UL (ref 4.4–11.3)

## 2024-06-26 PROCEDURE — 2500000001 HC RX 250 WO HCPCS SELF ADMINISTERED DRUGS (ALT 637 FOR MEDICARE OP)

## 2024-06-26 PROCEDURE — 82947 ASSAY GLUCOSE BLOOD QUANT: CPT

## 2024-06-26 PROCEDURE — 36415 COLL VENOUS BLD VENIPUNCTURE: CPT

## 2024-06-26 PROCEDURE — 2500000004 HC RX 250 GENERAL PHARMACY W/ HCPCS (ALT 636 FOR OP/ED)

## 2024-06-26 PROCEDURE — 85610 PROTHROMBIN TIME: CPT

## 2024-06-26 PROCEDURE — 94760 N-INVAS EAR/PLS OXIMETRY 1: CPT

## 2024-06-26 PROCEDURE — 84100 ASSAY OF PHOSPHORUS: CPT

## 2024-06-26 PROCEDURE — 87449 NOS EACH ORGANISM AG IA: CPT | Mod: GEALAB

## 2024-06-26 PROCEDURE — 2500000005 HC RX 250 GENERAL PHARMACY W/O HCPCS

## 2024-06-26 PROCEDURE — 2500000002 HC RX 250 W HCPCS SELF ADMINISTERED DRUGS (ALT 637 FOR MEDICARE OP, ALT 636 FOR OP/ED)

## 2024-06-26 PROCEDURE — 85025 COMPLETE CBC W/AUTO DIFF WBC: CPT

## 2024-06-26 PROCEDURE — 2500000004 HC RX 250 GENERAL PHARMACY W/ HCPCS (ALT 636 FOR OP/ED): Performed by: NURSE PRACTITIONER

## 2024-06-26 PROCEDURE — 94664 DEMO&/EVAL PT USE INHALER: CPT

## 2024-06-26 PROCEDURE — 83036 HEMOGLOBIN GLYCOSYLATED A1C: CPT | Mod: GEALAB

## 2024-06-26 PROCEDURE — 84132 ASSAY OF SERUM POTASSIUM: CPT | Mod: 91

## 2024-06-26 PROCEDURE — 94660 CPAP INITIATION&MGMT: CPT

## 2024-06-26 PROCEDURE — 94668 MNPJ CHEST WALL SBSQ: CPT

## 2024-06-26 PROCEDURE — 99291 CRITICAL CARE FIRST HOUR: CPT

## 2024-06-26 PROCEDURE — 1200000002 HC GENERAL ROOM WITH TELEMETRY DAILY

## 2024-06-26 PROCEDURE — 84145 PROCALCITONIN (PCT): CPT | Mod: GEALAB

## 2024-06-26 PROCEDURE — 93306 TTE W/DOPPLER COMPLETE: CPT

## 2024-06-26 PROCEDURE — 83735 ASSAY OF MAGNESIUM: CPT

## 2024-06-26 PROCEDURE — 87899 AGENT NOS ASSAY W/OPTIC: CPT | Mod: GEALAB

## 2024-06-26 PROCEDURE — 94640 AIRWAY INHALATION TREATMENT: CPT

## 2024-06-26 RX ORDER — ACETAMINOPHEN 325 MG/1
650 TABLET ORAL EVERY 6 HOURS PRN
Status: DISCONTINUED | OUTPATIENT
Start: 2024-06-26 | End: 2024-06-27 | Stop reason: HOSPADM

## 2024-06-26 RX ORDER — GLYCOPYRROLATE 0.2 MG/ML
0.2 INJECTION INTRAMUSCULAR; INTRAVENOUS EVERY 4 HOURS PRN
Status: DISCONTINUED | OUTPATIENT
Start: 2024-06-26 | End: 2024-06-27 | Stop reason: HOSPADM

## 2024-06-26 RX ORDER — PHYTONADIONE 5 MG/1
5 TABLET ORAL ONCE
Status: COMPLETED | OUTPATIENT
Start: 2024-06-26 | End: 2024-06-26

## 2024-06-26 RX ORDER — ACETAMINOPHEN 650 MG/1
650 SUPPOSITORY RECTAL EVERY 6 HOURS PRN
Status: DISCONTINUED | OUTPATIENT
Start: 2024-06-26 | End: 2024-06-27 | Stop reason: HOSPADM

## 2024-06-26 RX ORDER — METOPROLOL SUCCINATE 25 MG/1
25 TABLET, EXTENDED RELEASE ORAL DAILY
Status: DISCONTINUED | OUTPATIENT
Start: 2024-06-27 | End: 2024-06-27 | Stop reason: HOSPADM

## 2024-06-26 RX ORDER — ACETAMINOPHEN 650 MG/1
650 SUPPOSITORY RECTAL EVERY 4 HOURS PRN
Status: DISCONTINUED | OUTPATIENT
Start: 2024-06-26 | End: 2024-06-26

## 2024-06-26 RX ORDER — HALOPERIDOL 5 MG/ML
1 INJECTION INTRAMUSCULAR EVERY 4 HOURS PRN
Status: DISCONTINUED | OUTPATIENT
Start: 2024-06-26 | End: 2024-06-27 | Stop reason: HOSPADM

## 2024-06-26 RX ORDER — INSULIN LISPRO 100 [IU]/ML
0-5 INJECTION, SOLUTION INTRAVENOUS; SUBCUTANEOUS
Status: DISCONTINUED | OUTPATIENT
Start: 2024-06-26 | End: 2024-06-26

## 2024-06-26 RX ORDER — ACETAMINOPHEN 160 MG/5ML
650 SOLUTION ORAL EVERY 6 HOURS PRN
Status: DISCONTINUED | OUTPATIENT
Start: 2024-06-26 | End: 2024-06-26

## 2024-06-26 RX ORDER — LORAZEPAM 2 MG/ML
0.5 INJECTION INTRAMUSCULAR EVERY 4 HOURS PRN
Status: DISCONTINUED | OUTPATIENT
Start: 2024-06-26 | End: 2024-06-27 | Stop reason: HOSPADM

## 2024-06-26 ASSESSMENT — COGNITIVE AND FUNCTIONAL STATUS - GENERAL
MOVING TO AND FROM BED TO CHAIR: A LOT
DAILY ACTIVITIY SCORE: 14
TOILETING: A LOT
STANDING UP FROM CHAIR USING ARMS: A LOT
TURNING FROM BACK TO SIDE WHILE IN FLAT BAD: A LOT
DRESSING REGULAR UPPER BODY CLOTHING: A LOT
MOVING FROM LYING ON BACK TO SITTING ON SIDE OF FLAT BED WITH BEDRAILS: A LOT
DRESSING REGULAR LOWER BODY CLOTHING: TOTAL
EATING MEALS: A LITTLE
CLIMB 3 TO 5 STEPS WITH RAILING: A LOT
HELP NEEDED FOR BATHING: A LOT
WALKING IN HOSPITAL ROOM: A LOT
MOBILITY SCORE: 12

## 2024-06-26 ASSESSMENT — PAIN - FUNCTIONAL ASSESSMENT
PAIN_FUNCTIONAL_ASSESSMENT: 0-10

## 2024-06-26 ASSESSMENT — PAIN SCALES - GENERAL
PAINLEVEL_OUTOF10: 0 - NO PAIN
PAINLEVEL_OUTOF10: 6
PAINLEVEL_OUTOF10: 0 - NO PAIN

## 2024-06-26 NOTE — SIGNIFICANT EVENT
"  Clinical Event Note - Restraints Indicated    Event:      Restraints indicated as alternative therapies have been attempted and have been ineffective.  Restrain with soft wrist restraints and side rails up x4 until medical devices discontinued and/or patient able to participate with plan of care      Vital Signs:    Visit Vitals  /83 (BP Location: Left arm, Patient Position: Lying)   Pulse 95   Temp 36.1 °C (97 °F) (Temporal)   Resp 25   Ht 1.422 m (4' 8\")   Wt 69.5 kg (153 lb 3.5 oz)   SpO2 97%   BMI 34.35 kg/m²   OB Status Postmenopausal   Smoking Status Never   BSA 1.66 m²               Mary Carr MD  Internal Medicine, PGY- 1  06/25/24 at 11:08 AM     Disclaimer: Documentation completed with the information available at the time of input. The times in the chart may not be reflective of actual patient care times, interventions, or procedures. Documentation occurs after the physical care of the patient.    "
At bedside after blood gas. She is minimally responsive to voice, had to be sternal rubbed and did a thumbs up when asked after that.   Pt put on nonrebreather by nurse for increased O2 demands and then on BiPAP. Her BP was rechecked, noted to be 56/38 with fluids running. Lungs clear, heart regular rate. No pitting edema to legs. IVC bedside US appeared to be above 2cm.   Decided for transfer to ICU and started on low dose levophed peripherally. Stat labs ordered, CXR repeated, abx broadened. Suspect septic vs cardiogenic shock.   Family informed of status and transfer to ICU. She remains DNR and DNI.     
12-Mar-2023 23:00

## 2024-06-26 NOTE — PROGRESS NOTES
Occupational Therapy                 Therapy Communication Note    Patient Name: Erick Muhammad  MRN: [id number]  Today's Date: 6/26/2024     Discipline: Occupational Therapy    Missed Visit Reason: Missed Visit Reason: Other (Comment) (Pt medical status remains not appropriate for therapy services at this time, communicated with PT and RN.)    Missed Time: Attempt

## 2024-06-26 NOTE — CARE PLAN
The patient's goals for the shift include      The clinical goals for the shift include Patient will be more hemodynamically stable by end of shift.

## 2024-06-26 NOTE — PROGRESS NOTES
06/26/24 0908   Discharge Planning   Living Arrangements Alone   Support Systems Children;Family members;Friends/neighbors   Assistance Needed Patient was transfered from 2W to ICU and started on low dose levophed. At baseline: patient is normally A&Ox2-3, independent with ADLs, uses a walker or cane, drives and on room air (refuses to wear oxygen per daughter). Patient's PCP Dr. Cornelio Peck.   Type of Residence Private residence   Home or Post Acute Services Post acute facilities (Rehab/SNF/etc)   Type of Post Acute Facility Services Skilled nursing   Patient expects to be discharged to: PT/OT evals to be completed once patient is more medically stable.  Anticipate patient will need SNF.   Does the patient need discharge transport arranged? Yes   RoundTrip coordination needed? Yes   Has discharge transport been arranged? No

## 2024-06-26 NOTE — H&P
Critical Care Medicine History and Physical    HPI      Chief Complaint   Patient presents with    Shortness of Breath     Patient poor historian: Per chart initial H&P  Erick Muhammad is a 79 y.o. year old female  patient with past medical history significant for CKD (baseline GFR 41 to 45%, baseline creatinine 1.21 to 1.26), hypothyroidism, chronic anemia, HFpEF (EF 55-60%), rheumatoid arthritis, gout, LUCY (does not use CPAP), prediabetes (last HbA1c from 10/31/2023 at 5.7), vitamin D deficiency, recurrent UTIs (on daily cephalexin prophylaxis) was admitted on account of chest pain, shortness of breath, nausea and abdominal pain     History taken from daughter present at bedside and the patient.  The full course started this morning.  Patient states she felt like she was going to fall.  She said that she was very fatigued and endorsed dizziness.  Doctor stated that at around 1 PM patient called her granddaughter to tell her that she is having significant chest pain, substernal, described as a feeling of tightness, rated between 8-10 on a scale of 1-10.  Patient stated that nothing relieves the pain.  Did state that pain was associated with shortness of breath.  In regards to exertion, patient states that she usually gets short of breath on exertion and that is her baseline.  Patient also has ongoing cough that has been present for past couple of weeks.  Patient stated that she was previously seen on 06/13/2024 at [facility] ED for complaint of shortness of breath and cough.  At that time patient was given IV Lasix and discharged home.  Patient's daughter states that her cough has since then gotten worse.  Cough is wet in nature.  Patient denies any phlegm production with the cough but the daughter states that she 1.4 see her mother produce yellow phlegm at 1 time.  Patient otherwise denies any fevers, chills.  Patient states that she always feels cold, tired.  Patient has chronic lower extremity edema     Patient was  recently diagnosed with A-fib at Dr. Webb's office and started on rate control with metoprolol XL 50 mg twice daily.  Patient was also started on warfarin (unsure of the dose).  Patient states that she took warfarin from Monday till Friday and on Friday they checked the PT/INR and found that patient was supratherapeutic.  Patient's warfarin was subsequently held.  Patient currently denies any active bleeding, bleeding per rectally, hematuria, epistaxis.      Lab work in the ER showed glucose 179, sodium 135, potassium 4.1, BUN/creatinine 51/1.74, magnesium 1.9, BNP 2014, troponin 82, 67, INR 7.8, WBCs 10.3, H&H 13.7/40.4, chest x-ray showed mild vascular congestion without overt edema, UA showed possible UTI. EKG showed atrial fibrillation with known LBBB. She was given IV Lasix and started on antibiotics.    Patient admitted for acute hypoxic respiratory failure secondary to acute decompensated heart failure and CAP.     Tonight 6/25/24, around 9:45pm, Per Resident significant event note.   At bedside after blood gas. She is minimally responsive to voice, had to be sternal rubbed and did a thumbs up when asked after that.   Pt put on nonrebreather by nurse for increased O2 demands and then on BiPAP. Her BP was rechecked, noted to be 56/38 with fluids running. Lungs clear, heart regular rate. No pitting edema to legs. IVC bedside US appeared to be above 2cm.   Decided for transfer to ICU and started on low dose levophed peripherally. Stat labs ordered, CXR repeated, abx broadened. Suspect septic vs cardiogenic shock.   Family informed of status and transfer to ICU. She remains DNR and DNI.      ED Course   Vitals - BP 86/65 (BP Location: Right arm, Patient Position: Lying)   Pulse 101   Temp 36.3 °C (97.3 °F) (Temporal)   Resp 24   Wt 70.7 kg (155 lb 13.8 oz)   SpO2 100%   Labs -   Lab Results   Component Value Date    WBC 18.1 (H) 06/25/2024    HGB 11.5 (L) 06/25/2024    HCT 35.7 (L) 06/25/2024    MCV 97  "06/25/2024     06/25/2024     Lab Results   Component Value Date    GLUCOSE 140 (H) 06/25/2024    CALCIUM 9.2 06/25/2024     06/25/2024    K 4.1 06/25/2024    CO2 35 (H) 06/25/2024    CL 93 (L) 06/25/2024    BUN 54 (H) 06/25/2024    CREATININE 1.97 (H) 06/25/2024     Lab Results   Component Value Date    TROPHS 67 (HH) 06/24/2024     Lab Results   Component Value Date    BNP 2,014 (H) 06/24/2024   No results found for: \"DDIMERVTE\"  Imaging -   XR chest 2 views   Final Result   Mild vascular congestion without overt edema.   Signed by Tano Slade MD         Interventions -   Medications   oxygen (O2) therapy ( inhalation MAR Unhold 6/25/24 2239)   allopurinol (Zyloprim) tablet 200 mg ( oral MAR Unhold 6/25/24 2239)   levothyroxine (Synthroid, Levoxyl) tablet 112 mcg ( oral MAR Unhold 6/25/24 2239)   oxybutynin (Ditropan) tablet 5 mg ( oral MAR Unhold 6/25/24 2239)   torsemide (Demadex) tablet 60 mg ( oral Dose Auto Held 7/2/24 2100)   upadacitinib ER (Rinvoq) tablet 15 mg ( oral Dose Auto Held 7/2/24 0900)   cholecalciferol (Vitamin D-3) tablet 2,000 Units ( oral MAR Unhold 6/25/24 2239)   pantoprazole (ProtoNix) EC tablet 40 mg ( oral MAR Unhold 6/25/24 2239)   polyethylene glycol (Glycolax, Miralax) packet 17 g ( oral MAR Unhold 6/25/24 2239)   acetaminophen (Tylenol) tablet 650 mg ( oral MAR Unhold 6/25/24 2239)     Or   acetaminophen (Tylenol) oral liquid 650 mg ( nasogastric tube MAR Unhold 6/25/24 2239)     Or   acetaminophen (Tylenol) suppository 650 mg ( rectal MAR Unhold 6/25/24 2239)   perflutren lipid microspheres (Definity) injection 0.5-10 mL of dilution (has no administration in time range)   sulfur hexafluoride microsphr (Lumason) injection 24.28 mg (has no administration in time range)   perflutren protein A microsphere (Optison) injection 0.5 mL (has no administration in time range)   metoprolol succinate XL (Toprol-XL) 24 hr tablet 50 mg ( oral Dose Auto Held 7/2/24 2100)   glucagon " (Glucagen) injection 1 mg ( intramuscular MAR Unhold 6/25/24 2239)   dextrose 50 % injection 25 g ( intravenous MAR Unhold 6/25/24 2239)   glucagon (Glucagen) injection 1 mg ( intramuscular MAR Unhold 6/25/24 2239)   dextrose 50 % injection 12.5 g ( intravenous MAR Unhold 6/25/24 2239)   insulin lispro (HumaLOG) injection 0-5 Units ( subcutaneous MAR Unhold 6/25/24 2239)   potassium chloride CR (Klor-Con M20) ER tablet 40 mEq (40 mEq oral Given 6/25/24 2002)   magnesium oxide (Mag-Ox) tablet 400 mg ( oral MAR Unhold 6/25/24 2239)   warfarin (Coumadin) tablet 5 mg ( oral Dose Auto Held 6/30/24 1800)   furosemide (Lasix) injection 40 mg ( intravenous Dose Auto Held 6/30/24 1700)   oxygen (O2) therapy (40 percent inhalation Start 6/25/24 2150)   norepinephrine (Levophed) 8 mg in sodium chloride 0.9% 250 mL (0.032 mg/mL) infusion (premix) (0.01 mcg/kg/min × 69.5 kg intravenous Rate/Dose Verify 6/25/24 2301)   vancomycin (Vancocin) pharmacy to dose - pharmacy monitoring (has no administration in time range)   piperacillin-tazobactam-dextrose (Zosyn) IV 2.25 g (0 g intravenous Stopped 6/25/24 2308)   vancomycin (Vancocin) in dextrose 5 % water (D5W) 500 mL IV 1,500 mg (1,500 mg intravenous New Bag 6/25/24 2326)   ipratropium-albuteroL (Duo-Neb) 0.5-2.5 mg/3 mL nebulizer solution 3 mL ( nebulization MAR Unhold 6/25/24 2239)   ipratropium-albuteroL (Duo-Neb) 0.5-2.5 mg/3 mL nebulizer solution 3 mL ( nebulization MAR Unhold 6/25/24 2239)   lactated Ringer's bolus 500 mL (500 mL intravenous New Bag 6/25/24 2328)   furosemide (Lasix) injection 40 mg (40 mg intravenous Given 6/24/24 1806)   OLANZapine (ZyPREXA) injection 10 mg (10 mg intramuscular Given 6/25/24 1036)   furosemide (Lasix) injection 40 mg (40 mg intravenous Given 6/25/24 1452)   lactated Ringer's bolus 500 mL (0 mL intravenous Stopped 6/25/24 2136)       Micro:   - Blood culture:   - UA:    ED Interventions:   Consulted: IP CONSULT TO CARDIOLOGY  IP CONSULT TO  NUTRITION SERVICES  IP CONSULT TO SOCIAL WORK  PHARMACY TO DOSE VANCO    ROS: 12 points review of system is negative except as stated in the HPI above.       Past Medical History:   Diagnosis Date    Achilles tendinitis, unspecified leg 05/19/2014    Achilles tendinitis    Acute kidney failure, unspecified (CMS-HCC) 11/20/2020    REN (acute kidney injury)    Benign paroxysmal vertigo, unspecified ear 01/04/2017    BPPV (benign paroxysmal positional vertigo)    Bitten or stung by nonvenomous insect and other nonvenomous arthropods, initial encounter 07/23/2019    Insect bite, unspecified site, initial encounter    Cellulitis of face 01/17/2017    Cellulitis and abscess of face    Chronic systolic (congestive) heart failure (Multi) 09/11/2021    Chronic systolic congestive heart failure    Diarrhea, unspecified 01/04/2019    Acute diarrhea    Impacted cerumen, bilateral 04/24/2019    Impacted cerumen of both ears    Lesion of ulnar nerve, unspecified upper limb 05/19/2014    Cubital tunnel syndrome    Localized edema 07/29/2019    Edema of both legs    Other acute sinusitis 07/03/2019    Other acute sinusitis, recurrence not specified    Other conditions influencing health status     Osteoarthritis    Other microscopic hematuria 06/01/2017    Hematuria, microscopic    Other specified symptoms and signs involving the circulatory and respiratory systems 12/15/2021    Choking episode    Personal history of diseases of the blood and blood-forming organs and certain disorders involving the immune mechanism 01/24/2020    History of thrombocytopenia    Personal history of diseases of the blood and blood-forming organs and certain disorders involving the immune mechanism     History of iron deficiency anemia    Personal history of other diseases of the circulatory system 03/31/2020    History of intermittent claudication    Personal history of other diseases of the circulatory system     History of hypertension    Personal  history of other diseases of the respiratory system 03/27/2017    History of acute sinusitis    Personal history of other endocrine, nutritional and metabolic disease     History of hypothyroidism    Personal history of other specified conditions 12/22/2021    History of hoarseness    Personal history of other specified conditions 07/23/2019    History of dysuria    Personal history of pneumonia (recurrent) 02/10/2020    History of community acquired pneumonia    Personal history of urinary (tract) infections 06/06/2018    History of urinary tract infection    Rheumatoid arthritis, unspecified (Multi)     Rheumatoid arthritis    Trochanteric bursitis, left hip 09/19/2016    Greater trochanteric bursitis of left hip    Unspecified acute conjunctivitis, bilateral 01/16/2017    Acute conjunctivitis of both eyes, unspecified acute conjunctivitis type    Urinary tract infection, site not specified 07/03/2019    Acute lower UTI      Past Surgical History:   Procedure Laterality Date    CHOLECYSTECTOMY  01/04/2017    Cholecystectomy    DILATION AND CURETTAGE OF UTERUS  01/12/2017    Dilation And Curettage    ROTATOR CUFF REPAIR  01/04/2017    Rotator Cuff Repair    TOTAL ABDOMINAL HYSTERECTOMY  01/12/2017    Total Abdominal Hysterectomy    TOTAL KNEE ARTHROPLASTY  01/04/2017    Total Knee Arthroplasty      Past Surgical History:   Procedure Laterality Date    CHOLECYSTECTOMY  01/04/2017    Cholecystectomy    DILATION AND CURETTAGE OF UTERUS  01/12/2017    Dilation And Curettage    ROTATOR CUFF REPAIR  01/04/2017    Rotator Cuff Repair    TOTAL ABDOMINAL HYSTERECTOMY  01/12/2017    Total Abdominal Hysterectomy    TOTAL KNEE ARTHROPLASTY  01/04/2017    Total Knee Arthroplasty      Family History   Problem Relation Name Age of Onset    Coronary artery disease Mother      Arthritis Mother      Other (cardiac disorder) Mother      Hypertension Mother          essential    Heart attack Mother      Anxiety disorder Father       Depression Father      Arthritis Father      Blindness Father          or visual loss    Stroke Father      Deafness Father          or hearing loss    Hypertension Father          essential    Hyperlipidemia Father      Heart attack Father      Diabetes type II Father      Asthma Sister      Diabetes Sister      Hypertension Sister      Kidney disease Sister      Alcohol abuse Brother      Deep vein thrombosis Brother      Anxiety disorder Maternal Grandmother      Depression Maternal Grandmother      Arthritis Maternal Grandmother      Deafness Maternal Grandmother          or hearing loss    Diabetes Maternal Grandmother      Hypertension Maternal Grandmother      Heart attack Maternal Grandmother      Asthma Maternal Grandfather      Heart attack Maternal Grandfather      Asthma Paternal Grandmother      Heart attack Paternal Grandmother      Anxiety disorder Paternal Grandfather      Depression Paternal Grandfather      Asthma Paternal Grandfather      Deafness Paternal Grandfather          or hearing loss    Heart attack Paternal Grandfather        Allergies   Allergen Reactions    Carvedilol Other     Vomiting    Lisinopril Cough    Metoprolol Nausea Only    Milk Containing Products (Dairy) Diarrhea     Dairy    Morphine Hives     Morphine Derivatives    Nitrofurantoin Monohyd/M-Cryst Nausea Only    Trazodone Dizziness     Trazodone and Deriv     Meds: See med rec listed below    Social History:  - Currently   - Tobacco:   Social History     Tobacco Use   Smoking Status Never   Smokeless Tobacco Never      - Alcohol:   Social History     Substance and Sexual Activity   Alcohol Use Never      - Illicit Drugs:   Social History     Substance and Sexual Activity   Drug Use Never      She reports that she has never smoked. She has never used smokeless tobacco. She reports that she does not drink alcohol and does not use drugs.    Objective   Physical Exam  Constitutional:       Comments: Arousable to verbal  stimuli and follows basic command on BIPAP    HENT:      Head: Normocephalic and atraumatic.      Mouth/Throat:      Mouth: Mucous membranes are dry.   Cardiovascular:      Rate and Rhythm: Normal rate.   Pulmonary:      Effort: Respiratory distress present.      Breath sounds: No wheezing.      Comments: On bipap, diminished breath sounds bilaterally   Abdominal:      Palpations: Abdomen is soft.      Tenderness: There is no abdominal tenderness.   Musculoskeletal:         General: No swelling.      Right lower leg: No edema.      Left lower leg: No edema.   Skin:     General: Skin is dry.      Findings: No bruising.   Neurological:      Mental Status: She is disoriented.       I/Os    Intake/Output Summary (Last 24 hours) at 6/25/2024 2336  Last data filed at 6/25/2024 2328  Gross per 24 hour   Intake 1693.92 ml   Output 390 ml   Net 1303.92 ml       Scheduled Medications:   allopurinol, 200 mg, oral, Daily  cholecalciferol, 2,000 Units, oral, Daily  [Held by provider] furosemide, 40 mg, intravenous, BID  insulin lispro, 0-5 Units, subcutaneous, TID  [START ON 6/26/2024] ipratropium-albuteroL, 3 mL, nebulization, TID  lactated Ringer's, 500 mL, intravenous, Once  levothyroxine, 112 mcg, oral, Daily before breakfast  magnesium oxide, 400 mg, oral, Daily  [Held by provider] metoprolol succinate XL, 50 mg, oral, BID  oxybutynin, 5 mg, oral, TID  oxygen, , inhalation, Continuous - Inhalation  pantoprazole, 40 mg, oral, Daily before breakfast  perflutren lipid microspheres, 0.5-10 mL of dilution, intravenous, Once in imaging  perflutren protein A microsphere, 0.5 mL, intravenous, Once in imaging  piperacillin-tazobactam, 2.25 g, intravenous, q6h  polyethylene glycol, 17 g, oral, Daily  potassium chloride CR, 40 mEq, oral, BID  sulfur hexafluoride microsphr, 2 mL, intravenous, Once in imaging  [Held by provider] torsemide, 60 mg, oral, BID  [Held by provider] upadacitinib ER, 15 mg, oral, Daily  vancomycin, 1,500 mg,  intravenous, Once  [Held by provider] warfarin, 5 mg, oral, Daily         Continuous Medications:   norepinephrine, 0.01-1 mcg/kg/min, Last Rate: 0.01 mcg/kg/min (06/25/24 2301)         PRN Medications:   PRN medications: acetaminophen **OR** acetaminophen **OR** acetaminophen, dextrose, dextrose, glucagon, glucagon, ipratropium-albuteroL, oxygen, vancomycin      Objective   Vitals:  Most Recent:  Vitals:    06/25/24 2254   BP:    Pulse:    Resp:    Temp: 36.3 °C (97.3 °F)   SpO2:        24hr Min/Max:  Temp  Min: 36.1 °C (97 °F)  Max: 36.3 °C (97.3 °F)  Pulse  Min: 81  Max: 101  BP  Min: 56/38  Max: 105/83  Resp  Min: 16  Max: 32  SpO2  Min: 92 %  Max: 100 %    LDA:   External Urinary Catheter (Active)   Placement Date/Time: 06/25/24 0200     Number of days: 0         Vent settings:  FiO2 (%):  [32 %-40 %] 40 %    Hemodynamic parameters for last 24 hours:         Intake/Output Summary (Last 24 hours) at 6/25/2024 2336  Last data filed at 6/25/2024 2328  Gross per 24 hour   Intake 1693.92 ml   Output 390 ml   Net 1303.92 ml           Lab/Radiology/Diagnostic Review:  Results for orders placed or performed during the hospital encounter of 06/24/24 (from the past 24 hour(s))   Urinalysis with Reflex Microscopic   Result Value Ref Range    Color, Urine Light-Yellow Light-Yellow, Yellow, Dark-Yellow    Appearance, Urine Turbid (N) Clear    Specific Gravity, Urine 1.011 1.005 - 1.035    pH, Urine 5.5 5.0, 5.5, 6.0, 6.5, 7.0, 7.5, 8.0    Protein, Urine NEGATIVE NEGATIVE, 10 (TRACE), 20 (TRACE) mg/dL    Glucose, Urine Normal Normal mg/dL    Blood, Urine NEGATIVE NEGATIVE    Ketones, Urine NEGATIVE NEGATIVE mg/dL    Bilirubin, Urine NEGATIVE NEGATIVE    Urobilinogen, Urine Normal Normal mg/dL    Nitrite, Urine NEGATIVE NEGATIVE    Leukocyte Esterase, Urine 500 Ronda/µL (A) NEGATIVE   Urine electrolytes   Result Value Ref Range    Sodium, Urine Random 25 mmol/L    Sodium/Creatinine Ratio 36 Not established. mmol/g Creat     Potassium, Urine Random 18 mmol/L    Potassium/Creatinine Ratio 26 Not established mmol/g Creat    Chloride, Urine Random 28 mmol/L    Chloride/Creatinine Ratio 40 38 - 318 mmol/g creat    Creatinine, Urine Random 70.1 20.0 - 320.0 mg/dL   Sodium, Urine Random   Result Value Ref Range    Sodium, Urine Random 25 mmol/L    Creatinine, Urine Random 70.1 20.0 - 320.0 mg/dL    Sodium/Creatinine Ratio 36 Not established. mmol/g Creat   Microscopic Only, Urine   Result Value Ref Range    WBC, Urine 6-10 (A) 1-5, NONE /HPF    RBC, Urine 6-10 (A) NONE, 1-2, 3-5 /HPF    Squamous Epithelial Cells, Urine 10-25 (FEW) Reference range not established. /HPF    Mucus, Urine FEW Reference range not established. /LPF    Hyaline Casts, Urine 3+ (A) NONE /LPF   Creatinine, Urine Random   Result Value Ref Range    Creatinine, Urine Random 68.0 20.0 - 320.0 mg/dL   Procalcitonin   Result Value Ref Range    Procalcitonin 0.05 <=0.07 ng/mL   CBC   Result Value Ref Range    WBC 22.9 (H) 4.4 - 11.3 x10*3/uL    nRBC 0.2 (H) 0.0 - 0.0 /100 WBCs    RBC 3.77 (L) 4.00 - 5.20 x10*6/uL    Hemoglobin 11.8 (L) 12.0 - 16.0 g/dL    Hematocrit 36.3 36.0 - 46.0 %    MCV 96 80 - 100 fL    MCH 31.3 26.0 - 34.0 pg    MCHC 32.5 32.0 - 36.0 g/dL    RDW 18.6 (H) 11.5 - 14.5 %    Platelets 400 150 - 450 x10*3/uL   Magnesium   Result Value Ref Range    Magnesium 1.82 1.60 - 2.40 mg/dL   Comprehensive Metabolic Panel   Result Value Ref Range    Glucose 162 (H) 74 - 99 mg/dL    Sodium 135 (L) 136 - 145 mmol/L    Potassium 3.5 3.5 - 5.3 mmol/L    Chloride 91 (L) 98 - 107 mmol/L    Bicarbonate 32 21 - 32 mmol/L    Anion Gap 16 10 - 20 mmol/L    Urea Nitrogen 51 (H) 6 - 23 mg/dL    Creatinine 1.80 (H) 0.50 - 1.05 mg/dL    eGFR 28 (L) >60 mL/min/1.73m*2    Calcium 9.1 8.6 - 10.3 mg/dL    Albumin 3.8 3.4 - 5.0 g/dL    Alkaline Phosphatase 82 33 - 136 U/L    Total Protein 6.1 (L) 6.4 - 8.2 g/dL    AST 25 9 - 39 U/L    Bilirubin, Total 1.1 0.0 - 1.2 mg/dL    ALT 28 7 - 45  U/L   Phosphorus   Result Value Ref Range    Phosphorus 4.7 2.5 - 4.9 mg/dL   Sedimentation Rate   Result Value Ref Range    Sedimentation Rate 6 0 - 30 mm/h   Protime-INR   Result Value Ref Range    Protime 82.7 (HH) 9.8 - 12.8 seconds    INR 7.2 (HH) 0.9 - 1.1   TSH   Result Value Ref Range    Thyroid Stimulating Hormone 0.13 (L) 0.44 - 3.98 mIU/L   C-Reactive Protein   Result Value Ref Range    C-Reactive Protein 0.51 <1.00 mg/dL   T4, free   Result Value Ref Range    Thyroxine, Free 2.43 (H) 0.61 - 1.12 ng/dL   POCT GLUCOSE   Result Value Ref Range    POCT Glucose 183 (H) 74 - 99 mg/dL   Blood Culture    Specimen: Peripheral Venipuncture; Blood culture   Result Value Ref Range    Blood Culture Loaded on Instrument - Culture in progress    POCT GLUCOSE   Result Value Ref Range    POCT Glucose 186 (H) 74 - 99 mg/dL   Ammonia   Result Value Ref Range    Ammonia 34 16 - 53 umol/L   Blood Gas Arterial Full Panel   Result Value Ref Range    POCT pH, Arterial 7.34 (L) 7.38 - 7.42 pH    POCT pCO2, Arterial 65 (H) 38 - 42 mm Hg    POCT pO2, Arterial 93 85 - 95 mm Hg    POCT SO2, Arterial 99 94 - 100 %    POCT Oxy Hemoglobin, Arterial 96.2 94.0 - 98.0 %    POCT Hematocrit Calculated, Arterial 35.0 (L) 36.0 - 46.0 %    POCT Sodium, Arterial 131 (L) 136 - 145 mmol/L    POCT Potassium, Arterial 3.9 3.5 - 5.3 mmol/L    POCT Chloride, Arterial 92 (L) 98 - 107 mmol/L    POCT Ionized Calcium, Arterial 1.17 1.10 - 1.33 mmol/L    POCT Glucose, Arterial 193 (H) 74 - 99 mg/dL    POCT Lactate, Arterial 1.2 0.4 - 2.0 mmol/L    POCT Base Excess, Arterial 7.4 (H) -2.0 - 3.0 mmol/L    POCT HCO3 Calculated, Arterial 35.1 (H) 22.0 - 26.0 mmol/L    POCT Hemoglobin, Arterial 11.7 (L) 12.0 - 16.0 g/dL    POCT Anion Gap, Arterial 8 (L) 10 - 25 mmo/L    Patient Temperature      FiO2 28 %    Site of Arterial Puncture Radial Left     Brandon's Test Positive    ECG 12 Lead   Result Value Ref Range    Ventricular Rate 93 BPM    Atrial Rate 101 BPM     QRS Duration 144 ms    QT Interval 386 ms    QTC Calculation(Bazett) 479 ms    R Axis -30 degrees    T Axis 158 degrees    QRS Count 15 beats    Q Onset 213 ms    T Offset 406 ms    QTC Fredericia 447 ms   POCT GLUCOSE   Result Value Ref Range    POCT Glucose 196 (H) 74 - 99 mg/dL   Blood Gas Venous Full Panel   Result Value Ref Range    POCT pH, Venous 7.32 (L) 7.33 - 7.43 pH    POCT pCO2, Venous 70 (HH) 41 - 51 mm Hg    POCT pO2, Venous 73 (H) 35 - 45 mm Hg    POCT SO2, Venous 96 (H) 45 - 75 %    POCT Oxy Hemoglobin, Venous 92.8 (H) 45.0 - 75.0 %    POCT Hematocrit Calculated, Venous 38.0 36.0 - 46.0 %    POCT Sodium, Venous 132 (L) 136 - 145 mmol/L    POCT Potassium, Venous 3.9 3.5 - 5.3 mmol/L    POCT Chloride, Venous 92 (L) 98 - 107 mmol/L    POCT Ionized Calicum, Venous 1.18 1.10 - 1.33 mmol/L    POCT Glucose, Venous 144 (H) 74 - 99 mg/dL    POCT Lactate, Venous 1.5 0.4 - 2.0 mmol/L    POCT Base Excess, Venous 7.6 (H) -2.0 - 3.0 mmol/L    POCT HCO3 Calculated, Venous 36.1 (H) 22.0 - 26.0 mmol/L    POCT Hemoglobin, Venous 12.6 12.0 - 16.0 g/dL    POCT Anion Gap, Venous 8.0 (L) 10.0 - 25.0 mmol/L    Patient Temperature      FiO2 28 %   CBC   Result Value Ref Range    WBC 18.1 (H) 4.4 - 11.3 x10*3/uL    nRBC 0.2 (H) 0.0 - 0.0 /100 WBCs    RBC 3.70 (L) 4.00 - 5.20 x10*6/uL    Hemoglobin 11.5 (L) 12.0 - 16.0 g/dL    Hematocrit 35.7 (L) 36.0 - 46.0 %    MCV 97 80 - 100 fL    MCH 31.1 26.0 - 34.0 pg    MCHC 32.2 32.0 - 36.0 g/dL    RDW 18.7 (H) 11.5 - 14.5 %    Platelets 358 150 - 450 x10*3/uL   Comprehensive metabolic panel   Result Value Ref Range    Glucose 140 (H) 74 - 99 mg/dL    Sodium 138 136 - 145 mmol/L    Potassium 4.1 3.5 - 5.3 mmol/L    Chloride 93 (L) 98 - 107 mmol/L    Bicarbonate 35 (H) 21 - 32 mmol/L    Anion Gap 14 10 - 20 mmol/L    Urea Nitrogen 54 (H) 6 - 23 mg/dL    Creatinine 1.97 (H) 0.50 - 1.05 mg/dL    eGFR 25 (L) >60 mL/min/1.73m*2    Calcium 9.2 8.6 - 10.3 mg/dL    Albumin 3.9 3.4 - 5.0 g/dL     Alkaline Phosphatase 102 33 - 136 U/L    Total Protein 6.4 6.4 - 8.2 g/dL    AST 33 9 - 39 U/L    Bilirubin, Total 0.8 0.0 - 1.2 mg/dL    ALT 34 7 - 45 U/L   Magnesium   Result Value Ref Range    Magnesium 1.92 1.60 - 2.40 mg/dL   Phosphorus   Result Value Ref Range    Phosphorus 4.7 2.5 - 4.9 mg/dL   Lactate   Result Value Ref Range    Lactate 1.3 0.4 - 2.0 mmol/L     XR chest 1 view    Result Date: 6/25/2024  Interpreted By:  Zeina Yuen, STUDY: XR CHEST 1 VIEW 6/25/2024 10:16 pm   INDICATION: Signs/Symptoms:worse hypoxemia   COMPARISON: 06/24/2024   ACCESSION NUMBER(S): [id number]   ORDERING CLINICIAN: BRANDEE HAN   TECHNIQUE: AP view   FINDINGS: There are low lung volumes with an enlarged cardiac silhouette and mild pulmonary venous congestion. Small bilateral pleural effusions are noted. There is slight basilar atelectasis or infiltrates bilaterally.       Mild CHF with small pleural effusions and slight basilar atelectasis or infiltrates bilaterally. Findings have developed since the previous exam.   Signed by: Zeina Yuen 6/25/2024 10:48 PM Dictation workstation:   [id number]    ECG 12 Lead    Result Date: 6/25/2024  Atrial fibrillation with premature ventricular or aberrantly conducted complexes Left axis deviation Left bundle branch block Abnormal ECG When compared with ECG of 24-JUN-2024 16:26, (unconfirmed) QRS axis Shifted left Nonspecific T wave abnormality now evident in Inferior leads    CT chest abdomen pelvis wo IV contrast    Result Date: 6/25/2024  Interpreted By:  Nas Townsend, STUDY: CT CHEST ABDOMEN PELVIS WO CONTRAST;  6/25/2024 11:20 am   INDICATION: Signs/Symptoms:New onset altered mental status with leukocytes is.   COMPARISON: 06/26/2013   ACCESSION NUMBER(S): [id number]   ORDERING CLINICIAN: IJEOMA MUNOZ   TECHNIQUE: CT of the chest, abdomen, and pelvis was performed.  Contiguous axial images were obtained at 3 mm slice thickness through the chest, abdomen and pelvis.  Coronal and sagittal reconstructions at 3 mm slice thickness were performed. No intravenous contrast. Significant motion degradation limits evaluation.   FINDINGS: Lungs and Pleura: Small right pleural effusion with adjacent atelectasis. Patchy left basilar opacities suggestive of pneumonia. Smooth interlobular septal thickening is seen in the right lung apex suggestive of pulmonary edema.   Mediastinum: No adenopathy by CT size criteria. Cardiomegaly. No pericardial effusion. Advanced coronary artery calcifications. No thoracic aortic aneurysm.   Liver: The liver is unremarkable without focal lesion.   Gallbladder and Biliary: Status post cholecystectomy.   Pancreas: Diffuse fatty infiltration of the pancreas.   Spleen: No abnormality identified in the spleen.   Adrenals: No abnormality identified in either adrenal gland.   Urinary: Atrophic kidneys bilaterally. No hydronephrosis.   Reproductive: Status post hysterectomy.   Gastrointestinal/Peritoneum: No small or large bowel obstruction in the visualized abdomen. In the abdomen, there is no extraluminal air. No significant free fluid. Sigmoid colonic diverticulosis. Appendix not seen. No secondary signs of appendicitis in the pericecal region.   Vascular: Abdominal aorta is normal in caliber. Atherosclerosis.   Lymphatics: No enlarged lymph nodes by size criteria.   MSK/Body Wall/Chest Wall: Moderate compression fracture T12 vertebral body, new from prior exam. Osteopenia, correlate with DEXA scan. Small fat containing umbilical hernia.       Cardiomegaly, pulmonary edema, and small right pleural effusion.   Left basilar opacities suggestive of pneumonia.   Moderate compression fracture T12 vertebral body, age indeterminate but new from 2013. Osteopenia.   Signed by: Nas Townsend 6/25/2024 12:53 PM Dictation workstation:   [id number]    CT head wo IV contrast    Result Date: 6/25/2024  Interpreted By:  Nas Townsend, STUDY: CT HEAD WO IV CONTRAST;   6/25/2024 11:20 am   INDICATION: Signs/Symptoms:New altered mental status.   COMPARISON: None.   ACCESSION NUMBER(S): [id number]   ORDERING CLINICIAN: IJEOMA MUNOZ   TECHNIQUE: Noncontrast axial CT scan of head was performed. Multiplanar reconstructions. Limited evaluation due to lack of patient cooperation. Best images of achievable obtained.   FINDINGS: No acute intracranial hemorrhage, mass effect, midline shift, or herniation. No evidence of hydrocephalus. Periventricular and subcortical deep white matter hypodensity suggestive of chronic microangiopathic change. The ventricles and sulci are unremarkable for age. The visualized paranasal sinuses and mastoid air cells are clear. No acute osseous abnormality of the calvarium. No destructive bone lesion.       No acute intracranial abnormality. Consider follow-up with MRI as warranted.     Signed by: Nas Townsend 6/25/2024 12:47 PM Dictation workstation:   [id number]    ECG 12 lead    Result Date: 6/25/2024  Atrial fibrillation with premature ventricular or aberrantly conducted complexes Left bundle branch block Abnormal ECG When compared with ECG of 13-JUN-2024 17:27, QRS axis Shifted right    XR chest 2 views    Result Date: 6/24/2024  STUDY: Chest Radiographs;  06/24/2024 at 4:08 PM INDICATION: Shortness of breath. COMPARISON: XR chest 06/13/24, 03/21/20, 02/10/20. ACCESSION NUMBER(S): [id number] ORDERING CLINICIAN: GLADYS SWANSON TECHNIQUE:  Frontal and lateral chest. FINDINGS: CARDIOMEDIASTINAL SILHOUETTE: Heart is enlarged with increased pulmonary vascularity.  LUNGS: Lungs are clear.  ABDOMEN: No remarkable upper abdominal findings.  BONES: No acute osseous changes.    Mild vascular congestion without overt edema. Signed by Tano Slade MD    ECG 12 lead    Result Date: 6/20/2024  Atrial fibrillation Left bundle branch block Abnormal ECG When compared with ECG of 28-NOV-2020 14:14, Atrial fibrillation has replaced Sinus rhythm Vent. rate has  increased BY  47 BPM QT has lengthened See ED provider note for full interpretation and clinical correlation Confirmed by Sasha Barajas ([id number]) on 6/20/2024 5:18:12 PM    Vascular US Lower Extremity Venous Duplex Left    Result Date: 6/13/2024  Interpreted By:  Dami Strickland, STUDY: [facility] US LOWER EXTREMITY VENOUS DUPLEX LEFT  6/13/2024 6:39 pm   INDICATION: 78 y/o   F with  Signs/Symptoms:LLE swelling and pain. LMP:  Unknown.   COMPARISON: None.   ACCESSION NUMBER(S): [id number]   ORDERING CLINICIAN: MADISON PONCE   TECHNIQUE: Routine ultrasound of the  left lower extremity was performed with duplex Doppler (color and spectral) evaluation.   Static images were obtained for remote interpretation.   FINDINGS: THIGH VEINS:  The common femoral, femoral, popliteal, proximal medial saphenous, and deep femoral veins are patent and free of thrombus. The veins are normally compressible.  They demonstrate normal phasic flow and augmentation response.   CALF VEINS:  The paired peroneal and posterior tibial calf veins are patent.       No deep venous thrombosis of the  left lower extremity.   MACRO: None   Signed by: Dami Strickland 6/13/2024 6:42 PM Dictation workstation:   [id number]    XR ankle left 3+ views    Result Date: 6/13/2024  Interpreted By:  Mary Mccallum, STUDY: XR ANKLE LEFT 3+ VIEWS; ;  6/13/2024 5:13 pm   INDICATION: Signs/Symptoms:l ankle pain.   COMPARISON: None.   ACCESSION NUMBER(S): [id number]   ORDERING CLINICIAN: MADISON PONCE   FINDINGS: Three views of the left ankle   Bones are diffusely demineralized, limiting evaluation. A well corticated rounded opacities noted adjacent to the medial malleolus measuring up to 8 mm on AP and oblique imaging. Otherwise no definite fracture or dislocation is identified. The ankle mortise and talar dome are intact. No sizable joint effusion.   Degenerative changes noted within the midfoot. Prominent plantar calcaneal enthesophyte. Extensive vascular calcifications  present. Soft tissue swelling about the ankle.       Soft tissue swelling and ossification along the medial malleolus. Clinical correlation with point tenderness is suggested.   Demineralization and degenerative changes.   MACRO: None   Signed by: Mary Mccallum 6/13/2024 6:06 PM Dictation workstation:   [id number]    XR chest 1 view    Result Date: 6/13/2024  Interpreted By:  Mary Mccallum, STUDY: XR CHEST 1 VIEW;  6/13/2024 5:13 pm   INDICATION: Signs/Symptoms:sob.   COMPARISON: 11/28/2020   ACCESSION NUMBER(S): [id number]   ORDERING CLINICIAN: MADISON PONCE   FINDINGS: AP radiograph of the chest was provided.       CARDIOMEDIASTINAL SILHOUETTE: Cardiomediastinal silhouette is stable in size and configuration.   LUNGS: Low lung volumes with bronchovascular crowding. Evaluation partially the location body habitus. No focal consolidation, pleural effusion or sizable pneumothorax seen.   ABDOMEN: No remarkable upper abdominal findings.   BONES: No acute osseous changes.       1.  Enlarged cardiac silhouette without definite focal consolidation within constraints of imaging. Pulmonary vascular congestion not excluded.       MACRO: None   Signed by: Mary Mccallum 6/13/2024 6:04 PM Dictation workstation:   [id number]      Assessment/Plan   Erick LOPEZ Jb79 y.o. female  with past medical history significant HTN, Afib on warfarin, HFpEF (EF 55-60%), CKD (baseline GFR 41 to 45%, baseline creatinine 1.21 to 1.26), hypothyroidism, chronic anemia, rheumatoid arthritis, gout, LUCY (does not use CPAP), prediabetes (last HbA1c from 10/31/2023 at 5.7), vitamin D deficiency, recurrent UTIs (on daily cephalexin prophylaxis) was transferred to the ICU for shock concern and placed on levophed. She was initially admitted to stepdown unit for acute hypoxic hypercapnic respiratory failure 2/2 acute CHF exacerbation and pneumonia.    NEURO/PSYCH  #Acute Encephalopathy 2/2 Metabolic/Infectious/other   -Tx underlying causes including  hypercapnia, infectious process, and other underlying etiologies   -CT head did not show acute findings   -Workup included B12, Folate, TSH, ammonia, urine and blood cultures   -Placed on restraints initially and removed in the ICU. Sitter at bedside  -Delirium precautions     CVS  #Shock concern  -Placed on levophed 0.01; MAP in the 50s   -Patient appear dry on exam with IVC collapsible; S/p 1L LR (500ml x2 LR bolus)  -Rule out shock causes including hypovolemic due to overdiuresis vs cardiogenic vs septic   -TTE and cultures pending     #Acute on chronic systolic and diastolic CHF   -Echocardiogram from 2022, LVEF 50 to 55%, LVH with diastolic dysfunction, left atrial enlargement, moderate AAS, heavily calcified mitral valve annulus, mild mitral stenosis, mild to moderate MR/TR, moderate pulmonary hypertension   -initially CXR showed mild vascular congestion; placed on IV lasix   -Repeat echo pending   -Holding diuresis in the setting of shock concern/hypotension  -daily weights, strict IsOs, fluid restriction   -Cardiology following; appreciate recs     #Paroxysmal Afib  -Hold metoprolol in setting of shock concern/hypotension  -Hold warfarin in setting of supra therapeutic INR  -Cardiology following; appreciate recs     #Troponemia   -likely demand ischemia /Non MI elevation 2/2 CHF, Afib, and pneumonia  -Trended down     #HTN  -Holding  BP meds for now given shock concern    PULMONARY  #Acute on chronic hypoxic hypercapnic respiratory failure 2/2 acute CHF exacerbation and CAP with risk factors   #LUCY not on CPAP at home   -Per daughter patient was advised to use oxygen at baseline, also advised to use CPAP which patient is noncompliant  -CT Chest showed small right pleural effusion with adjacent atelectasis. Patchy left basilar opacities suggestive of pneumonia. Smooth interlobular septal thickening is seen in the right lung apex suggestive of pulmonary edema.   -Last VBG showed PH 7.28, PCO2 72, and PO2 26;  placed on BIPAP  -Initially placed on Rocephin and Azithromycin for CAP; Switched to Zosyn and Vanc given worsening condition. Discontinued Vanc given negative MRSA PCR  -Procal, urine Ag, and blood cultures pending   -Holding diuresis in the setting of shock concern/hypotension  -Duoneb  -incentive spirometry and bronchial hygiene   -Wean as tolerated     GI  #GERD  -Continue PPI    #Constipation  -Continue bowel regimen     RENAL/LYTES/  #REN on CKD  -Baseline GFR 41 to 45%, baseline creatinine 1.21 to 1.26.    =Creatinine elevated at admission to 1.7 with GFR 30  =Creatinine worsening 1.97  -prerenal vs cardiorenal in origin vs. meds induced  -CT abdomen showed atrophic kidneys bilaterally. No hydronephrosis.   -Renal US pending    -Supportive: Avoid nephrotoxic meds, IsOs, and  Daily RFPs    #Acute urinary retention  -Raymundo ordered in ICU    #Recurrent UTI   -Holding home keflex given the patient on Zosyn     #Urge continence  -Switched home oxybutynin XL 15 mg to oxybutynin 5 mg 3 times daily due to formulary availability.     ID  #Leukocytosis   #CAP with risk factors   #UTI   -Discontinued Rocephin and Azithromycin given worsening status   -Started Vanc and Zosyn; Vanc discontinued given MRSA PCR negative   -Continue Zosyn (6/26-)  -Urine and blood cultures pending     HEME/ONC  #Supratherapeutic INR   -Last INR 7.2  -Holding warfarin   -No signs of acute bleed; Hgb stable   -Daily INR     ENDO  #Hypothyroidism  -Continue home levothyroxine 112 mcg every day     #NIDDM-II:  -Currently not on any treatment.  Last HbA1c from 10/31/2023 at 5.7   -Ordered mild SSI #1 with AC plus at bedtime Accu-Cheks.  Hypoglycemia protocol in place    MSK  #Deconditioning   -PT/OT     #RA  -Hold home Rinvoq given concern for infectious process    #Gout/CKD  -Continue home Allopurinol     F: PO intake  E: Monitor and replete PRN  N: NPO  GI: PPI  DVT: SCDs    Code Status: DNR and No Intubation   Emergency Contact: Extended  Emergency Contact Information  Primary Emergency Contact: Eliana Frederick  Home Phone: [phone]  Mobile Phone: [phone]  Relation: Child     Dispo: 79 y.o.female admitted for shock concern placed on levophed. Rule out causes. Anticipate >48hr inpatient LOS.

## 2024-06-26 NOTE — CARE PLAN
The patient's goals for the shift include      The clinical goals for the shift include Patient will be more hemodynamically stable by end of shift.    Over the shift, the patient did not make progress toward the following goals. Barriers to progression include hemodynamic instability. Recommendations to address these barriers include.

## 2024-06-26 NOTE — CONSULTS
Patient has Malnutrition Diagnosis: No  Nutrition Assessment    Reason for Assessment: Admission nursing screening (MST=5, weight loss, eating poorly)    Patient is a 79 y.o. female presenting with: Acute on chronic congestive heart failure, unspecified heart failure type (Multi),   Past Medical History:   Diagnosis Date    Achilles tendinitis, unspecified leg 05/19/2014    Achilles tendinitis    Acute kidney failure, unspecified (CMS-[facility]) 11/20/2020    REN (acute kidney injury)    Benign paroxysmal vertigo, unspecified ear 01/04/2017    BPPV (benign paroxysmal positional vertigo)    Bitten or stung by nonvenomous insect and other nonvenomous arthropods, initial encounter 07/23/2019    Insect bite, unspecified site, initial encounter    Cellulitis of face 01/17/2017    Cellulitis and abscess of face    Chronic systolic (congestive) heart failure (Multi) 09/11/2021    Chronic systolic congestive heart failure    Diarrhea, unspecified 01/04/2019    Acute diarrhea    Impacted cerumen, bilateral 04/24/2019    Impacted cerumen of both ears    Lesion of ulnar nerve, unspecified upper limb 05/19/2014    Cubital tunnel syndrome    Localized edema 07/29/2019    Edema of both legs    Other acute sinusitis 07/03/2019    Other acute sinusitis, recurrence not specified    Other conditions influencing health status     Osteoarthritis    Other microscopic hematuria 06/01/2017    Hematuria, microscopic    Other specified symptoms and signs involving the circulatory and respiratory systems 12/15/2021    Choking episode    Personal history of diseases of the blood and blood-forming organs and certain disorders involving the immune mechanism 01/24/2020    History of thrombocytopenia    Personal history of diseases of the blood and blood-forming organs and certain disorders involving the immune mechanism     History of iron deficiency anemia    Personal history of other diseases of the circulatory system 03/31/2020    History of  intermittent claudication    Personal history of other diseases of the circulatory system     History of hypertension    Personal history of other diseases of the respiratory system 03/27/2017    History of acute sinusitis    Personal history of other endocrine, nutritional and metabolic disease     History of hypothyroidism    Personal history of other specified conditions 12/22/2021    History of hoarseness    Personal history of other specified conditions 07/23/2019    History of dysuria    Personal history of pneumonia (recurrent) 02/10/2020    History of community acquired pneumonia    Personal history of urinary (tract) infections 06/06/2018    History of urinary tract infection    Rheumatoid arthritis, unspecified (Multi)     Rheumatoid arthritis    Trochanteric bursitis, left hip 09/19/2016    Greater trochanteric bursitis of left hip    Unspecified acute conjunctivitis, bilateral 01/16/2017    Acute conjunctivitis of both eyes, unspecified acute conjunctivitis type    Urinary tract infection, site not specified 07/03/2019    Acute lower UTI      Past Surgical History:   Procedure Laterality Date    CHOLECYSTECTOMY  01/04/2017    Cholecystectomy    DILATION AND CURETTAGE OF UTERUS  01/12/2017    Dilation And Curettage    ROTATOR CUFF REPAIR  01/04/2017    Rotator Cuff Repair    TOTAL ABDOMINAL HYSTERECTOMY  01/12/2017    Total Abdominal Hysterectomy    TOTAL KNEE ARTHROPLASTY  01/04/2017    Total Knee Arthroplasty      Nutrition History:  Food and Nutrient History: Pt seen per MST screen, for weight loss, eating poorly. Pt NPO this AM, diet advanced for lunch today. Pt reports decreased appetite, able to prepare meals, but has not felt like it too much lately.  Pt declines need for ONS. Pt reports her weights fluctuate with fluid status changes.  Energy Intake: Good > 75 %  Allergies   Allergen Reactions    Carvedilol Other     Vomiting    Lisinopril Cough    Metoprolol Nausea Only    Milk Containing  "Products (Dairy) Diarrhea     Dairy    Morphine Hives     Morphine Derivatives    Nitrofurantoin Monohyd/M-Cryst Nausea Only    Trazodone Dizziness     Trazodone and Deriv      GI Symptoms: None     Oral Problems: None          Anthropometrics:  Height: 142.2 cm (4' 8\")   Weight: 70.1 kg (154 lb 8.7 oz)   BMI (Calculated): 34.67  IBW/kg (Dietitian Calculated): 41.8 kg  Percent of IBW: 168 %       Weight History:   Wt Readings from Last 10 Encounters:   06/26/24 70.1 kg (154 lb 8.7 oz)   06/13/24 74.8 kg (165 lb)   02/15/24 74.4 kg (164 lb)   11/27/23 75.8 kg (167 lb)   11/03/23 76.2 kg (168 lb)   10/27/23 78 kg (172 lb)   06/19/23 82.6 kg (182 lb)   02/16/23 84.4 kg (186 lb)   12/28/22 83.5 kg (184 lb)   09/12/22 88 kg (194 lb)   69.5 kg on admission (6/24/24)     Weight Change %:  Weight History / % Weight Change: 70.1 kg (6/26/24); 69.5 kg (6/24/24); 74.4 kg (2/15/24)down 5.7%/4 months; 82.6 kg (6/19/23) down 15%/1 year  Significant Weight Loss: No          Nutrition Focused Physical Exam Findings:    Subcutaneous Fat Loss:   Orbital Fat Pads: Well nourished (slightly bulging fat pads)  Buccal Fat Pads: Well nourished (full, rounded cheeks)  Muscle Wasting:  Temporalis: Well nourished (well-defined muscle)  Edema:  Edema: +1 trace (bilat LE edema)  Physical Findings:  Skin: Negative (skin intact)    Nutrition Significant Labs:  CBC Trend:   Results from last 7 days   Lab Units 06/26/24  0439 06/25/24  2151 06/25/24  0553 06/24/24  1600   WBC AUTO x10*3/uL 15.6* 18.1* 22.9* 10.3   RBC AUTO x10*6/uL 3.47* 3.70* 3.77* 4.31   HEMOGLOBIN g/dL 10.9* 11.5* 11.8* 13.7   HEMATOCRIT % 33.8* 35.7* 36.3 40.4   MCV fL 97 97 96 94   PLATELETS AUTO x10*3/uL 317 358 400 504*    , BMP Trend:   Results from last 7 days   Lab Units 06/26/24  0439 06/25/24  2151 06/25/24  0553 06/24/24  1600   GLUCOSE mg/dL 180* 140* 162* 179*   CALCIUM mg/dL 8.9 9.2 9.1 10.2   SODIUM mmol/L 136 138 135* 135*   POTASSIUM mmol/L 4.1 4.1 3.5 4.1   CO2 " mmol/L 32 35* 32 33*   CHLORIDE mmol/L 93* 93* 91* 90*   BUN mg/dL 52* 54* 51* 51*   CREATININE mg/dL 1.83* 1.97* 1.80* 1.74*    , A1C:  Lab Results   Component Value Date    HGBA1C 5.7 (H) 10/31/2023   , TPN/PPN Labs:   Results from last 7 days   Lab Units 06/26/24  0439 06/25/24  2151 06/25/24  0553 06/24/24  1600 06/24/24  1600   GLUCOSE mg/dL 180* 140* 162*  --  179*   POTASSIUM mmol/L 4.1 4.1 3.5  --  4.1   PHOSPHORUS mg/dL 3.7 4.7 4.7   < >  --    MAGNESIUM mg/dL 1.79 1.92 1.82  --  1.92   SODIUM mmol/L 136 138 135*  --  135*   CHLORIDE mmol/L 93* 93* 91*  --  90*   ALT U/L 32 34 28  --  39   AST U/L 30 33 25  --  43*   ALK PHOS U/L 93 102 82  --  109   BILIRUBIN TOTAL mg/dL 0.9 0.8 1.1  --  1.1    < > = values in this interval not displayed.    , Vit D:   Lab Results   Component Value Date    VITD25 31 10/31/2023    , Vit B12:   Lab Results   Component Value Date    VDSZEXCY87 879 06/25/2024    , CRP:   Lab Results   Component Value Date    CRP 0.51 06/25/2024       Lab Results   Component Value Date    ALBUMIN 3.6 06/26/2024       Nutrition Specific Medications:      Current Facility-Administered Medications:     acetaminophen (Tylenol) tablet 650 mg, 650 mg, oral, q6h PRN, 650 mg at 06/25/24 2001 **OR** acetaminophen (Tylenol) oral liquid 650 mg, 650 mg, nasogastric tube, q6h PRN **OR** acetaminophen (Tylenol) suppository 650 mg, 650 mg, rectal, q6h PRN, Mandeep Chen MD    allopurinol (Zyloprim) tablet 200 mg, 200 mg, oral, Daily, Mandeep Chen MD, 200 mg at 06/26/24 0939    cholecalciferol (Vitamin D-3) tablet 2,000 Units, 2,000 Units, oral, Daily, Mandeep Chen MD, 2,000 Units at 06/26/24 0939    dextrose 50 % injection 12.5 g, 12.5 g, intravenous, q15 min PRN, Mandeep Chen MD    dextrose 50 % injection 25 g, 25 g, intravenous, q15 min PRN, Mandeep Chen MD    [Held by provider] furosemide (Lasix) injection 40 mg, 40 mg, intravenous, BID, Mandeep Chen MD    glucagon (Glucagen) injection 1 mg, 1 mg,  intramuscular, q15 min PRN, Mandeep Chen MD    glucagon (Glucagen) injection 1 mg, 1 mg, intramuscular, q15 min PRN, Mandeep Chen MD    insulin lispro (HumaLOG) injection 0-5 Units, 0-5 Units, subcutaneous, Before meals & nightly, Mandeep Chen MD    ipratropium-albuteroL (Duo-Neb) 0.5-2.5 mg/3 mL nebulizer solution 3 mL, 3 mL, nebulization, TID, Mandeep Chen MD, 3 mL at 06/26/24 0753    ipratropium-albuteroL (Duo-Neb) 0.5-2.5 mg/3 mL nebulizer solution 3 mL, 3 mL, nebulization, q2h PRN, Mandeep Chen MD    levothyroxine (Synthroid, Levoxyl) tablet 112 mcg, 112 mcg, oral, Daily before breakfast, Mandeep Chen MD, 112 mcg at 06/26/24 0907    magnesium oxide (Mag-Ox) tablet 400 mg, 400 mg, oral, Daily, Mandeep Chen MD, 400 mg at 06/26/24 0939    [Held by provider] metoprolol succinate XL (Toprol-XL) 24 hr tablet 50 mg, 50 mg, oral, BID, Mandeep Chen MD, 50 mg at 06/24/24 2042    norepinephrine (Levophed) 8 mg in sodium chloride 0.9% 250 mL (0.032 mg/mL) infusion (premix), 0.01-1 mcg/kg/min, intravenous, Continuous, Mandeep Chen MD, Stopped at 06/26/24 0000    oxybutynin (Ditropan) tablet 5 mg, 5 mg, oral, TID, Mandeep Chen MD, 5 mg at 06/26/24 0939    oxygen (O2) therapy, , inhalation, Continuous - Inhalation, Mandeep Chen MD, 3 L/min at 06/26/24 0753    oxygen (O2) therapy, , inhalation, Continuous PRN - O2/gases, Mandeep Chen MD, 40 percent at 06/26/24 0330    pantoprazole (ProtoNix) EC tablet 40 mg, 40 mg, oral, Daily before breakfast, Mandeep Chen MD, 40 mg at 06/26/24 0907    perflutren lipid microspheres (Definity) injection 0.5-10 mL of dilution, 0.5-10 mL of dilution, intravenous, Once in imaging, Mandeep Chen MD    piperacillin-tazobactam-dextrose (Zosyn) IV 2.25 g, 2.25 g, intravenous, q6h, Mandeep Chen MD, Stopped at 06/26/24 1036    polyethylene glycol (Glycolax, Miralax) packet 17 g, 17 g, oral, Daily, Mandeep Chen MD    [Held by provider] torsemide (Demadex) tablet 60 mg, 60 mg, oral, BID,  Mandeep Chen MD, 60 mg at 06/24/24 2042    [Held by provider] upadacitinib ER (Rinvoq) tablet 15 mg, 15 mg, oral, Daily, Mandeep Chen MD    [Held by provider] warfarin (Coumadin) tablet 5 mg, 5 mg, oral, Daily, Mandeep Chen MD   Nursing Data Per flowsheet:      Gastrointestinal  Gastrointestinal (WDL): Within Defined Limits    Intake/Output Summary (Last 24 hours) at 6/26/2024 1245  Last data filed at 6/26/2024 1036  Gross per 24 hour   Intake 2115.2 ml   Output 800 ml   Net 1315.2 ml       0-10 (Numeric) Pain Score: 0 - No pain   Dietary Orders (From admission, onward)       Start     Ordered    06/26/24 1140  Adult diet Carb Controlled; 45 gram carb/meal, 15 gram Carb evening snack  Diet effective now        Question Answer Comment   Diet type Carb Controlled    Carb diet selection: 45 gram carb/meal, 15 gram Carb evening snack        06/26/24 1139    06/25/24 0747  May Participate in Room Service  Once        Question:  .  Answer:  Yes    06/25/24 0748                     Estimated Needs:   Total Energy Estimated Needs (kCal):  (1752 kcal (25 kcal/kg))     Total Protein Estimated Needs (g):  ( g protein (1.2-1.5 g/kg))     Total Fluid Estimated Needs (mL):  (1 ml/kcal or per MD)          Nutrition Diagnosis   Malnutrition Diagnosis  Patient has Malnutrition Diagnosis: No    Nutrition Diagnosis  Patient has Nutrition Diagnosis: Yes  Diagnosis Status (1): New  Nutrition Diagnosis 1: Increased nutrient needs  Related to (1): chronic UTI's  As Evidenced by (1): increased nutrient demand for UTI  Additional Assessment Information (1): pt decline ONS when offered       Nutrition Interventions/Recommendations   Nutrition Prescription:  diet, fluids    Nutrition Interventions:     Coordination of Care: Loli,RN  Nutrition Education:   Education Documentation  No documentation found.       Recommendations:  Encourage oral intakes, liberalize diet if oral intakes are suboptimal  Weights: daily for trends, anticipate  fluctuates with CHF/diuretic use  RFP, Mg daily; replete lytes prn          Nutrition Monitoring and Evaluation     Food/Nutrient Related History Monitoring  Monitoring and Evaluation Plan: Amount of food  Criteria: Pt will consume 50-75% of meals provided daily    Body Composition/Growth/Weight History  Monitoring and Evaluation Plan: Weight  Weight: Measured weight  Criteria: Pt will maintain weight of 70.1 kg +/- 2  kg      Time Spent (min): 60 minutes

## 2024-06-26 NOTE — CONSULTS
"Discussion with patient and daughter Eliana CONKLIN.   Reports pt has lost 45 lbs over the past 1 year and has barely been eating since around the 6th of this month.  Counseling provided on pt's terminal condition.  Discussed quality of life expectations with this type of heart disease   Discussed options to continue survival based care, comfort care, or combination.  Notably, she also stated that she would not want the doctors to interfere with her passing from low blood pressure by giving her medications or fluids.   Pt was able to participate some and was very clear in saying yes to comfort care when her daughter Eliana (POA) asked her.   Daughter wanted to clarify more, so she did also ask \" you want comfort care just like dad?\" And the pt replied \"yes.\"   Discussion on code status occurred and in accordance with pt's living will that we reviewed together, pt and daughter both confirmed that they wanted additional limitations to the DNR an DNI in place.  They elected DNRCC  Did advise that pt is fairly stable at this time, however, her health could take a turn for the worst anytime without intervention. Daughter verbalized understanding.  Education on hospice provided           Terminal Dx: Systolic and Diastolic CHF w cardiomyopathy, acute on chronic resp failure    GOC: Comfort    Plan:   Initiate comfort care   Hospice consulted, meeting TBD, likely on 6/26  Spiritual care consult per dtrs request      Abbreviated note at this time for communication purposes  Full note to follow     DW Dr. Thomas    "

## 2024-06-26 NOTE — PROGRESS NOTES
Steve Paez is a 79 y.o. female on day 2 of admission presenting with Acute on chronic congestive heart failure, unspecified heart failure type (Multi).      Subjective   Pt was seen and examined today. Currently she is on 3L O2 by NC. Denied nay chest pain , fever, chill, leg swelling, light headedness or dizziness but mild sob and coughing.     She was a transfer from floor overnight for possible septic shock, off Levo since midnight. Got total 1L LR after coming to ICU overnight       Objective     Last Recorded Vitals  /67 (BP Location: Right arm, Patient Position: Sitting)   Pulse 96   Temp 36.6 °C (97.9 °F) (Temporal)   Resp 14   Wt 70.1 kg (154 lb 8.7 oz)   SpO2 100%   Intake/Output last 3 Shifts:    Intake/Output Summary (Last 24 hours) at 6/26/2024 1151  Last data filed at 6/26/2024 1036  Gross per 24 hour   Intake 2415.2 ml   Output 800 ml   Net 1615.2 ml       Admission Weight  Weight: 73.5 kg (162 lb 0.6 oz) (06/24/24 1552)    Daily Weight  06/26/24 : 70.1 kg (154 lb 8.7 oz)    Image Results  Transthoracic Echo (TTE) [facility], [address] [place] [address]                Tel [phone] and Fax [phone]    TRANSTHORACIC ECHOCARDIOGRAM REPORT       Patient Name:      STEVE PAEZ          Reading Physician:    [id number] Trevon Hernandez MD  Study Date:        6/26/2024            Ordering Provider:    [id number] DAMI SMALL  MRN/PID:           [id number]             Fellow:  Accession#:        [id number]         Nurse:  Date of Birth/Age: 1945 / 79 years Sonographer:          Carmen Jeter RDCS  Gender:            F                    Additional Staff:  Height:            147.32 cm            Admit Date:           6/24/2024  Weight:            69.85 kg              Admission Status:     Inpatient - Time                                                                Critical  BSA / BMI:         1.63 m2 / 32.19      Encounter#:           [id number]                     kg/m2  Blood Pressure:    105/83 mmHg          Department Location:  [facility] Non                                                                Invasive    Study Type:    TRANSTHORACIC ECHO (TTE) COMPLETE  Diagnosis/ICD: Heart failure, unspecified-I50.9; Abnormal electrocardiogram                 [ECG] [EKG]-R94.31  Indication:    Abnormal EKG, Congestive Heart Failure  CPT Code:      Echo Complete w Full Doppler-[id number]    Patient History:  Pertinent History: CKD, Nausea, Abdominal pain, Dyspnea, CHF and A-Fib.    Study Detail: The following Echo studies were performed: 2D, M-Mode, Doppler and                color flow. Technically challenging study due to No Pedoff or                Definity due to pt. agitation at end of test. The patient was                awake.       PHYSICIAN INTERPRETATION:  Left Ventricle: The left ventricular systolic function is severely decreased, with a visually estimated ejection fraction of 20-25%. There is global hypokinesis of the left ventricle with minor regional variations. The left ventricular cavity size is moderately dilated. There is mild concentric left ventricular hypertrophy. Findings are consistent with dilated cardiomyopathy. Spectral Doppler shows a restrictive pattern of left ventricular diastolic filling. There are elevated left atrial and left ventricular end diastolic pressures.  Left Atrium: The left atrium is mild to moderately dilated.  Right Ventricle: The right ventricle is normal in size. There is mildly reduced right ventricular systolic function.  Right Atrium: The right atrium is mildly dilated.  Aortic Valve: The aortic valve is trileaflet. There is mild aortic valve cusp calcification. There is evidence of mildly elevated transaortic gradients  consistent with sclerosis of the aortic valve. The aortic valve dimensionless index is 0.52. There is trivial aortic valve regurgitation. The peak instantaneous gradient of the aortic valve is 10.7 mmHg. The mean gradient of the aortic valve is 6.4 mmHg.  Mitral Valve: The mitral valve is normal in structure. There is severe mitral annular calcification. There is mild to moderate mitral valve regurgitation.  Tricuspid Valve: The tricuspid valve is structurally normal. There is mild tricuspid regurgitation.  Pulmonic Valve: The pulmonic valve is structurally normal. There is physiologic pulmonic valve regurgitation.  Pericardium: There is no pericardial effusion noted.  Aorta: The aortic root is normal.  Pulmonary Artery: The tricuspid regurgitant velocity is 3.51 m/s, and with an estimated right atrial pressure of 8 mmHg, the estimated pulmonary artery pressure is mild to moderately elevated with the RVSP at 57.3 mmHg.  Systemic Veins: The inferior vena cava appears to be of normal size. There is poor inspiratory collapse of the IVC (less than 50%), consistent with elevated right atrial pressure.       CONCLUSIONS:   1. The left ventricular systolic function is severely decreased, with a visually estimated ejection fraction of 20-25%.   2. There is global hypokinesis of the left ventricle with minor regional variations.   3. Spectral Doppler shows a restrictive pattern of left ventricular diastolic filling.   4. There are elevated left atrial and left ventricular end diastolic pressures.   5. Left ventricular cavity size is moderately dilated.   6. There is dilated cardiomyopathy.   7. There is mildly reduced right ventricular systolic function.   8. The left atrium is mild to moderately dilated.   9. There is severe mitral annular calcification.  10. Mild to moderate mitral valve regurgitation.  11. Aortic valve sclerosis.  12. Mild to moderately elevated pulmonary artery pressure. Estimated PASP around 45-50 mm  Hg, CVP is elevated.    QUANTITATIVE DATA SUMMARY:  2D MEASUREMENTS:                            Normal Ranges:  IVSd:          1.25 cm    (0.6-1.1cm)  LVPWd:         1.04 cm    (0.6-1.1cm)  LVIDd:         5.28 cm    (3.9-5.9cm)  LVIDs:         3.66 cm  LV Mass Index: 147.0 g/m2  LV % FS        30.6 %    LA VOLUME:                                Normal Ranges:  LA Vol A4C:        72.9 ml    (22+/-6mL/m2)  LA Vol A2C:        72.4 ml  LA Vol BP:         73.9 ml  LA Vol Index A4C:  44.7 ml/m2  LA Vol Index A2C:  44.4 ml/m2  LA Vol Index BP:   45.3 ml/m2  LA Area A4C:       22.3 cm2  LA Area A2C:       22.6 cm2  LA Major Axis A4C: 5.8 cm  LA Major Axis A2C: 6.0 cm  LA Volume Index:   45.4 ml/m2  LA Vol A4C:        63.8 ml  LA Vol A2C:        69.1 ml    RA VOLUME BY A/L METHOD:                        Normal Ranges:  RA Area A4C: 17.5 cm2    LV SYSTOLIC FUNCTION BY 2D PLANIMETRY (MOD):                       Normal Ranges:  EF-A4C View:    22 % (>=55%)  EF-A2C View:    37 %  EF-Biplane:     34 %  EF-Visual:      23 %  LV EF Reported: 23 %    LV DIASTOLIC FUNCTION:                      Normal Ranges:  MV Peak E: 1.07 m/s (0.7-1.2 m/s)    MITRAL VALVE:                       Normal Ranges:  MV Vmax:    1.14 m/s (<=1.3m/s)  MV peak P.2 mmHg (<5mmHg)  MV mean P.6 mmHg (<2mmHg)  MV VTI:     19.60 cm (10-13cm)  MV DT:      168 msec (150-240msec)    MITRAL INSUFFICIENCY:                          Normal Ranges:  MR VTI:     136.87 cm  MR Vmax:    436.59 cm/s  MR Volume:  25.37 ml  MR Flow Rt: 80.93 ml/s  MR EROA:    0.19 cm2    AORTIC VALVE:                                     Normal Ranges:  AoV Vmax:                1.64 m/s  (<=1.7m/s)  AoV Peak PG:             10.7 mmHg (<20mmHg)  AoV Mean P.4 mmHg  (1.7-11.5mmHg)  LVOT Max Johnny:            0.69 m/s  (<=1.1m/s)  AoV VTI:                 29.24 cm  (18-25cm)  LVOT VTI:                15.29 cm  LVOT Diameter:           1.97 cm   (1.8-2.4cm)  AoV Area, VTI:            1.59 cm2  (2.5-5.5cm2)  AoV Area,Vmax:           1.29 cm2  (2.5-4.5cm2)  AoV Dimensionless Index: 0.52       RIGHT VENTRICLE:  RV Basal 2.70 cm  RV Mid   1.90 cm  RV Major 7.4 cm  TAPSE:   16.0 mm  RV s'    0.06 m/s    TRICUSPID VALVE/RVSP:                              Normal Ranges:  Peak TR Velocity: 3.51 m/s  RV Syst Pressure: 57.3 mmHg (< 30mmHg)  IVC Diam:         1.80 cm    PULMONIC VALVE:                       Normal Ranges:  PV Max Johnny: 0.8 m/s  (0.6-0.9m/s)  PV Max P.9 mmHg    AORTA:  Asc Ao Diam 2.73 cm       [id number] Trevon Hernandez MD  Electronically signed on 2024 at 11:11:21 AM       ** Final **  ECG 12 lead  Atrial fibrillation with rapid ventricular response with premature ventricular or aberrantly conducted complexes  Nonspecific intraventricular block  Cannot rule out Septal infarct , age undetermined  T wave abnormality, consider inferior ischemia  Abnormal ECG  When compared with ECG of 2024 13:20, (unconfirmed)  QRS axis Shifted right  T wave inversion now evident in Inferior leads  ECG 12 lead  Atrial fibrillation with premature ventricular or aberrantly conducted complexes  Left bundle branch block  Abnormal ECG  When compared with ECG of 2024 22:38, (unconfirmed)  No significant change was found      Physical Exam  Cardiovascular:      Rate and Rhythm: Tachycardia present. Rhythm irregular.      Heart sounds: No murmur heard.     No friction rub. No gallop.   Pulmonary:      Effort: Pulmonary effort is normal.      Breath sounds: Normal breath sounds. No wheezing, rhonchi or rales.   Abdominal:      General: Bowel sounds are normal. There is no distension.      Palpations: Abdomen is soft.      Tenderness: There is no abdominal tenderness. There is no guarding or rebound.   Musculoskeletal:      Right lower leg: No edema.      Left lower leg: No edema.   Neurological:      Mental Status: She is alert.         Relevant Results               Assessment/Plan      Erick Muhammad79 y.o. female  with past medical history significant HTN, Afib on warfarin, HFpEF (EF 55-60%, 2021), CKD (baseline GFR 41 to 45%, baseline creatinine 1.21 to 1.26), hypothyroidism, chronic anemia, rheumatoid arthritis, gout, LUCY (does not use CPAP), prediabetes (last HbA1c from 10/31/2023 at 5.7), vitamin D deficiency, recurrent UTIs (on daily cephalexin prophylaxis) was transferred to the ICU for shock concern with pressors. She was initially admitted to stepdown unit for acute hypoxic hypercapnic respiratory failure 2/2 acute CHF exacerbation and pneumonia.     NEURO/PSYCH  # Acute Encephalopathy 2/2 Metabolic/Infectious(resolved)  -May be 2/2 hypercapnia, infectious process, and other underlying etiologies   -CT head did not show acute findings   -B12 879,  -Workup included  Folate,  ammonia, urine and blood cultures   -Delirium precautions      CVS  # Shock   - can be septic 2/2 PNA/ hypovolumic 2/2 over diuresis/cardiogenic  -was on levophed 0.01 for 2 hrs off now.   -Patient appeared to be  dry on exam with IVC collapsible; S/p 1L LR (500ml x2 LR bolus)  - Morning /68, little tachycardic, but MAP stable.   - Cardiology on board, recommended Toprol 25 mg daily for tachycardia.         #Acute on chronic systolic and diastolic CHF     -initial CXR : mild vascular congestion;  -  received IV diuresis with Lasix total 80 mg yesterday.   -Echo (2022): LVEF 50 to 55%, LVH with diastolic dysfunction, left atrial enlargement, moderate AAS, heavily calcified mitral valve annulus, mild mitral stenosis, mild to moderate MR/TR, moderate pulmonary hypertension   -Repeat Echo(6/26/24) : 1. EF 20-25%.   2. There is global hypokinesis of the left ventricle with minor regional variations.   3. restrictive pattern of left ventricular diastolic filling.   4. There are elevated left atrial and left ventricular end diastolic pressures.   5. Left ventricular cavity size is moderately dilated.   6. There is  dilated cardiomyopathy.   7. There is mildly reduced right ventricular systolic function.   8. The left atrium is mild to moderately dilated.   9. There is severe mitral annular calcification.  10. Mild to moderate mitral valve regurgitation.  11. Aortic valve sclerosis.  12. Mild to moderately elevated pulmonary artery pressure. Estimated PASP around 45-50 mm Hg, CVP is elevated.    Plan  -Toprol 25 mg daily for tachycardia.   -Holding diuresis in the setting of shock concern/hypotension  -daily weights, strict IsOs, fluid restriction   =Cardiology recs:   - cont current management.   - Due to low BP  ml x 4 hrs, if not improved, another 250 ml can be given,   - Toprol 25 if SBP reaches 100.   - Out pt right and left heart cath, lifevest at discharge and referral to EP as outpt for ICD     #Paroxysmal Afib  - Cardiology following, recommended Toprol 25 mg daily for tachycardia.   -Hold warfarin in setting of supra therapeutic INR  - daily INR chek.        #Troponemia   -likely demand ischemia /Non MI elevation 2/2 CHF, Afib, and pneumonia  -Trended down      #HTN  - Holding  BP meds for shock concern       PULMONARY  #Acute on chronic hypoxic hypercapnic respiratory failure 2/2 acute CHF exacerbation and BL CAP  # BL basilar CAP  # LUCY not on CPAP at home   -Per daughter patient was advised to use oxygen at baseline, also advised to use CPAP which patient is noncompliant  - CXR: Mild CHF with small pleural effusions and slight basilar atelectasis  or infiltrates bilaterally.  -CT Chest : right pleural effusion with adjacent atelectasis. Bibasilar pneumonia.  pulmonary edema.   -Last VB.35/61/52/33.7.  - was placed on BIPAP  - current;y saturating 100% on 3L NC.   - Holding diuresis in the setting of shock concern/hypotension  - urine Ag, and blood cultures pending   - Pro renzo 0.09  - on Zosyn,   - Duo neb, ICS, bronchial hygiene   -Wean as tolerated      GI  #GERD  -Continue PPI      #Constipation  -Continue bowel regimen      RENAL/LYTES/  #REN on CKD  -Baseline GFR 41 to 45%, baseline creatinine 1.21 to 1.26.    - Cr 1.74>>1.80>>1.97>>1.83, trending down.   - prerenal , FENa: 0.5%  -CT abdomen showed atrophic kidneys bilaterally. No hydronephrosis.   -Renal US came normal   - received 1L LR after coming to ICU.   - Avoid nephrotoxic meds, IsOs, and  Daily RFPs     #Acute urinary retention  -Raymundo in .      #Recurrent UTI   -Holding home keflex   - on Zosyn      #Urge continence  -Switched home oxybutynin XL 15 mg to oxybutynin 5 mg 3 times daily due to formulary availability.      ID  #Leukocytosis   #CAP with risk factors   #UTI   -Continue Zosyn (6/26-)  -Legionella and strep pneumoniae , Urine and blood cultures pending      HEME/ONC  # Supra therapeutic INR   -Last INR 7.2, morning no calculable.   -Holding warfarin   -No signs of acute bleed; Hgb stable   - Vit k 5 mg orally once   -Daily INR      ENDO  # Hypothyroidism  - T4 1.87, TSH 0.20  -Continue home levothyroxine 112 mcg daily.      # NIDDM-II:  -Currently not on any treatment.  Last HbA1c from 10/31/2023 at 5.7   -Ordered mild SSI #1 with AC plus at bedtime Accu-Cheks.  Hypoglycemia protocol in place  - A1c ordered.      MSK  # Deconditioning   -PT/OT      # RA  -Hold home Rinvoq given concern for infectious process     #Gout/CKD  -Continue home Allopurinol      F: PO intake  E: replete PRN  N: diabetic   GI: PPI  DVT: SCDs     Code Status: DNR DNI  Emergency Contact: Extended Emergency Contact Information  Primary Emergency Contact: Eliana Frederick  Home Phone: [phone]  Mobile Phone: [phone]  Relation: Child      Dispo: 79 y.o.female admitted for shock concern and aeHErEF, was on levophed. Off now.  Anticipate 24 hr inpatient LOS.              Principal Problem:    Acute on chronic congestive heart failure, unspecified heart failure type (Multi)                  Rosio Montes MD

## 2024-06-26 NOTE — PROGRESS NOTES
"Erick Muhammad is a 79 y.o. female on day 2 of admission presenting with Acute on chronic congestive heart failure, unspecified heart failure type (Multi).      Subjective   She is more awake and alert today. Overnight she was minimally responsive. She was placed on NRB and then Bipap. She was hypotensive and transferred to ICU and placed on a levophed gtt.     She is currently on 3L O2 per NC, moist nonproductive cough.       Review of systems:  Constitutional: negative for fever, chills, or malaise  Neuro: negative for dizziness, headache, numbness, tingling  ENT: Negative for nasal congestion or sore throat  CV: negative for chest pain, palpitations  GI: negative for abd pain, nausea, vomiting or diarrhea  : negative for dysuria, frequency, or urgency  Skin: negative for lesions, wounds, or rash  Musculoskeletal: Negative for myalgia, or arthralgia  Endocrine: Negative for polyuria or polydipsia         Objective   Constitutional: Well developed, alert and oriented x3, no distress  Eyes: PERRL, clear sclera  ENMT: mucous membranes moist, no apparent injury, no lesions seen  Head/Neck: Neck supple, no apparent injury, thyroid without mass or tenderness, No JVD, trachea midline, no bruits  Respiratory/Thorax: Patent airways, rales bilat bases with good chest expansion, thorax symmetric  Cardiovascular: irregular rhythm, slightly tachycardic, no murmurs, 2+ equal pulses of the extremities, normal S 1and S 2  Gastrointestinal: Nondistended, soft, non-tender, no rebound tenderness or guarding, no masses palpable, no organomegaly, +BS, no bruits  Musculoskeletal: RAMOS  Extremities: no edema  Neurological: lethargic  Lymphatic: No significant lymphadenopathy  Skin: pale, Warm and dry, no lesions, no rashes      Last Recorded Vitals  BP 90/52   Pulse 96   Temp 36.6 °C (97.9 °F) (Temporal)   Resp 23   Ht 1.422 m (4' 8\")   Wt 70.1 kg (154 lb 8.7 oz)   SpO2 96%   BMI 34.65 kg/m²     Intake/Output last 3 Shifts:  I/O " last 3 completed shifts:  In: 2725.2 (38.9 mL/kg) [P.O.:840; I.V.:1.9 (0 mL/kg); IV Piggyback:1883.3]  Out: 790 (11.3 mL/kg) [Urine:790 (0.3 mL/kg/hr)]  Weight: 70.1 kg   I/O this shift:  In: 490 [P.O.:440; IV Piggyback:50]  Out: 340 [Urine:340]    Relevant Results  Scheduled medications  allopurinol, 200 mg, oral, Daily  cholecalciferol, 2,000 Units, oral, Daily  [Held by provider] furosemide, 40 mg, intravenous, BID  insulin lispro, 0-5 Units, subcutaneous, Before meals & nightly  ipratropium-albuteroL, 3 mL, nebulization, TID  levothyroxine, 112 mcg, oral, Daily before breakfast  magnesium oxide, 400 mg, oral, Daily  [Held by provider] metoprolol succinate XL, 25 mg, oral, Daily  oxybutynin, 5 mg, oral, TID  oxygen, , inhalation, Continuous - Inhalation  pantoprazole, 40 mg, oral, Daily before breakfast  perflutren lipid microspheres, 0.5-10 mL of dilution, intravenous, Once in imaging  piperacillin-tazobactam, 2.25 g, intravenous, q6h  polyethylene glycol, 17 g, oral, Daily  [Held by provider] torsemide, 60 mg, oral, BID  [Held by provider] upadacitinib ER, 15 mg, oral, Daily  [Held by provider] warfarin, 5 mg, oral, Daily      Continuous medications  norepinephrine, 0.01-1 mcg/kg/min, Last Rate: Stopped (06/26/24 0000)      PRN medications  PRN medications: acetaminophen **OR** acetaminophen **OR** acetaminophen, dextrose, dextrose, glucagon, glucagon, ipratropium-albuteroL, oxygen    Results for orders placed or performed during the hospital encounter of 06/24/24 (from the past 24 hour(s))   POCT GLUCOSE   Result Value Ref Range    POCT Glucose 196 (H) 74 - 99 mg/dL   Blood Gas Venous Full Panel   Result Value Ref Range    POCT pH, Venous 7.32 (L) 7.33 - 7.43 pH    POCT pCO2, Venous 70 (HH) 41 - 51 mm Hg    POCT pO2, Venous 73 (H) 35 - 45 mm Hg    POCT SO2, Venous 96 (H) 45 - 75 %    POCT Oxy Hemoglobin, Venous 92.8 (H) 45.0 - 75.0 %    POCT Hematocrit Calculated, Venous 38.0 36.0 - 46.0 %    POCT Sodium,  Venous 132 (L) 136 - 145 mmol/L    POCT Potassium, Venous 3.9 3.5 - 5.3 mmol/L    POCT Chloride, Venous 92 (L) 98 - 107 mmol/L    POCT Ionized Calicum, Venous 1.18 1.10 - 1.33 mmol/L    POCT Glucose, Venous 144 (H) 74 - 99 mg/dL    POCT Lactate, Venous 1.5 0.4 - 2.0 mmol/L    POCT Base Excess, Venous 7.6 (H) -2.0 - 3.0 mmol/L    POCT HCO3 Calculated, Venous 36.1 (H) 22.0 - 26.0 mmol/L    POCT Hemoglobin, Venous 12.6 12.0 - 16.0 g/dL    POCT Anion Gap, Venous 8.0 (L) 10.0 - 25.0 mmol/L    Patient Temperature      FiO2 28 %   Vitamin B12   Result Value Ref Range    Vitamin B12 879 211 - 911 pg/mL   Folate   Result Value Ref Range    Folate, Serum 12.3 >5.0 ng/mL   CBC   Result Value Ref Range    WBC 18.1 (H) 4.4 - 11.3 x10*3/uL    nRBC 0.2 (H) 0.0 - 0.0 /100 WBCs    RBC 3.70 (L) 4.00 - 5.20 x10*6/uL    Hemoglobin 11.5 (L) 12.0 - 16.0 g/dL    Hematocrit 35.7 (L) 36.0 - 46.0 %    MCV 97 80 - 100 fL    MCH 31.1 26.0 - 34.0 pg    MCHC 32.2 32.0 - 36.0 g/dL    RDW 18.7 (H) 11.5 - 14.5 %    Platelets 358 150 - 450 x10*3/uL   Comprehensive metabolic panel   Result Value Ref Range    Glucose 140 (H) 74 - 99 mg/dL    Sodium 138 136 - 145 mmol/L    Potassium 4.1 3.5 - 5.3 mmol/L    Chloride 93 (L) 98 - 107 mmol/L    Bicarbonate 35 (H) 21 - 32 mmol/L    Anion Gap 14 10 - 20 mmol/L    Urea Nitrogen 54 (H) 6 - 23 mg/dL    Creatinine 1.97 (H) 0.50 - 1.05 mg/dL    eGFR 25 (L) >60 mL/min/1.73m*2    Calcium 9.2 8.6 - 10.3 mg/dL    Albumin 3.9 3.4 - 5.0 g/dL    Alkaline Phosphatase 102 33 - 136 U/L    Total Protein 6.4 6.4 - 8.2 g/dL    AST 33 9 - 39 U/L    Bilirubin, Total 0.8 0.0 - 1.2 mg/dL    ALT 34 7 - 45 U/L   Magnesium   Result Value Ref Range    Magnesium 1.92 1.60 - 2.40 mg/dL   Phosphorus   Result Value Ref Range    Phosphorus 4.7 2.5 - 4.9 mg/dL   Blood Culture    Specimen: Peripheral Venipuncture; Blood culture   Result Value Ref Range    Blood Culture Loaded on Instrument - Culture in progress    Blood Culture    Specimen:  Peripheral Venipuncture; Blood culture   Result Value Ref Range    Blood Culture Loaded on Instrument - Culture in progress    Lactate   Result Value Ref Range    Lactate 1.3 0.4 - 2.0 mmol/L   Troponin I, High Sensitivity   Result Value Ref Range    Troponin I, High Sensitivity 65 (HH) 0 - 13 ng/L   TSH with reflex to Free T4 if abnormal   Result Value Ref Range    Thyroid Stimulating Hormone 0.20 (L) 0.44 - 3.98 mIU/L   Thyroxine, Free   Result Value Ref Range    Thyroxine, Free 1.87 (H) 0.61 - 1.12 ng/dL   ECG 12 lead   Result Value Ref Range    Ventricular Rate 105 BPM    Atrial Rate 115 BPM    QRS Duration 142 ms    QT Interval 384 ms    QTC Calculation(Bazett) 507 ms    R Axis 77 degrees    T Axis 260 degrees    QRS Count 17 beats    Q Onset 213 ms    T Offset 405 ms    QTC Fredericia 462 ms   MRSA Surveillance for Vancomycin De-escalation, PCR    Specimen: Anterior Nares; Swab   Result Value Ref Range    MRSA PCR Not Detected Not Detected   BLOOD GAS VENOUS FULL PANEL   Result Value Ref Range    POCT pH, Venous 7.28 (L) 7.33 - 7.43 pH    POCT pCO2, Venous 72 (HH) 41 - 51 mm Hg    POCT pO2, Venous 26 (L) 35 - 45 mm Hg    POCT SO2, Venous 37 (L) 45 - 75 %    POCT Oxy Hemoglobin, Venous 36.8 (L) 45.0 - 75.0 %    POCT Hematocrit Calculated, Venous 36.0 36.0 - 46.0 %    POCT Sodium, Venous 135 (L) 136 - 145 mmol/L    POCT Potassium, Venous 4.6 3.5 - 5.3 mmol/L    POCT Chloride, Venous 91 (L) 98 - 107 mmol/L    POCT Ionized Calicum, Venous 1.18 1.10 - 1.33 mmol/L    POCT Glucose, Venous 119 (H) 74 - 99 mg/dL    POCT Lactate, Venous 3.5 (H) 0.4 - 2.0 mmol/L    POCT Base Excess, Venous 5.0 (H) -2.0 - 3.0 mmol/L    POCT HCO3 Calculated, Venous 33.8 (H) 22.0 - 26.0 mmol/L    POCT Hemoglobin, Venous 12.0 12.0 - 16.0 g/dL    POCT Anion Gap, Venous 15.0 10.0 - 25.0 mmol/L    Patient Temperature      FiO2 40 %   Blood Gas Venous Full Panel   Result Value Ref Range    POCT pH, Venous 7.35 7.33 - 7.43 pH    POCT pCO2, Venous  61 (H) 41 - 51 mm Hg    POCT pO2, Venous 52 (H) 35 - 45 mm Hg    POCT SO2, Venous 85 (H) 45 - 75 %    POCT Oxy Hemoglobin, Venous 82.4 (H) 45.0 - 75.0 %    POCT Hematocrit Calculated, Venous 34.0 (L) 36.0 - 46.0 %    POCT Sodium, Venous 132 (L) 136 - 145 mmol/L    POCT Potassium, Venous 4.2 3.5 - 5.3 mmol/L    POCT Chloride, Venous 92 (L) 98 - 107 mmol/L    POCT Ionized Calicum, Venous 1.19 1.10 - 1.33 mmol/L    POCT Glucose, Venous 171 (H) 74 - 99 mg/dL    POCT Lactate, Venous 1.4 0.4 - 2.0 mmol/L    POCT Base Excess, Venous 6.5 (H) -2.0 - 3.0 mmol/L    POCT HCO3 Calculated, Venous 33.7 (H) 22.0 - 26.0 mmol/L    POCT Hemoglobin, Venous 11.4 (L) 12.0 - 16.0 g/dL    POCT Anion Gap, Venous 11.0 10.0 - 25.0 mmol/L    Patient Temperature      FiO2 40 %   Protime-INR   Result Value Ref Range    Protime >100.0 (HH) 9.8 - 12.8 seconds    INR     CBC and Auto Differential   Result Value Ref Range    WBC 15.6 (H) 4.4 - 11.3 x10*3/uL    nRBC 0.0 0.0 - 0.0 /100 WBCs    RBC 3.47 (L) 4.00 - 5.20 x10*6/uL    Hemoglobin 10.9 (L) 12.0 - 16.0 g/dL    Hematocrit 33.8 (L) 36.0 - 46.0 %    MCV 97 80 - 100 fL    MCH 31.4 26.0 - 34.0 pg    MCHC 32.2 32.0 - 36.0 g/dL    RDW 18.4 (H) 11.5 - 14.5 %    Platelets 317 150 - 450 x10*3/uL    Neutrophils % 88.3 40.0 - 80.0 %    Immature Granulocytes %, Automated 1.2 (H) 0.0 - 0.9 %    Lymphocytes % 3.6 13.0 - 44.0 %    Monocytes % 6.4 2.0 - 10.0 %    Eosinophils % 0.2 0.0 - 6.0 %    Basophils % 0.3 0.0 - 2.0 %    Neutrophils Absolute 13.80 (H) 1.60 - 5.50 x10*3/uL    Immature Granulocytes Absolute, Automated 0.18 0.00 - 0.50 x10*3/uL    Lymphocytes Absolute 0.56 (L) 0.80 - 3.00 x10*3/uL    Monocytes Absolute 1.00 (H) 0.05 - 0.80 x10*3/uL    Eosinophils Absolute 0.03 0.00 - 0.40 x10*3/uL    Basophils Absolute 0.04 0.00 - 0.10 x10*3/uL   Comprehensive Metabolic Panel   Result Value Ref Range    Glucose 180 (H) 74 - 99 mg/dL    Sodium 136 136 - 145 mmol/L    Potassium 4.1 3.5 - 5.3 mmol/L     Chloride 93 (L) 98 - 107 mmol/L    Bicarbonate 32 21 - 32 mmol/L    Anion Gap 15 10 - 20 mmol/L    Urea Nitrogen 52 (H) 6 - 23 mg/dL    Creatinine 1.83 (H) 0.50 - 1.05 mg/dL    eGFR 28 (L) >60 mL/min/1.73m*2    Calcium 8.9 8.6 - 10.3 mg/dL    Albumin 3.6 3.4 - 5.0 g/dL    Alkaline Phosphatase 93 33 - 136 U/L    Total Protein 5.8 (L) 6.4 - 8.2 g/dL    AST 30 9 - 39 U/L    Bilirubin, Total 0.9 0.0 - 1.2 mg/dL    ALT 32 7 - 45 U/L   Magnesium   Result Value Ref Range    Magnesium 1.79 1.60 - 2.40 mg/dL   Phosphorus   Result Value Ref Range    Phosphorus 3.7 2.5 - 4.9 mg/dL   Procalcitonin   Result Value Ref Range    Procalcitonin 0.09 (H) <=0.07 ng/mL   POCT GLUCOSE   Result Value Ref Range    POCT Glucose 131 (H) 74 - 99 mg/dL   Transthoracic Echo (TTE) Complete   Result Value Ref Range    AV pk lolly 1.64 m/s    AV mn grad 6.4 mmHg    LVOT diam 1.97 cm    LA vol index A/L 45.3 ml/m2    Tricuspid annular plane systolic excursion 1.6 cm    LV EF 23 %    RV free wall pk S' 6.00 cm/s    LVIDd 5.28 cm    RVSP 57.3 mmHg    Aortic Valve Area by Continuity of VTI 1.59 cm2    Aortic Valve Area by Continuity of Peak Velocity 1.29 cm2    AV pk grad 10.7 mmHg    LV A4C EF 21.6    POCT GLUCOSE   Result Value Ref Range    POCT Glucose 124 (H) 74 - 99 mg/dL       Transthoracic Echo (TTE) Complete   Final Result      XR chest 1 view   Final Result   Mild CHF with small pleural effusions and slight basilar atelectasis   or infiltrates bilaterally. Findings have developed since the   previous exam.        Signed by: Zeina Yuen 6/25/2024 10:48 PM   Dictation workstation:   [id number]       renal complete   Final Result   Normal study.        MACRO:   None        Signed by: Jerry Bragg 6/26/2024 1:10 PM   Dictation workstation:   [id number]      CT head wo IV contrast   Final Result   No acute intracranial abnormality. Consider follow-up with MRI as   warranted.             Signed by: Nas Townsend 6/25/2024 12:47 PM    Dictation workstation:   [id number]      CT chest abdomen pelvis wo IV contrast   Final Result   Cardiomegaly, pulmonary edema, and small right pleural effusion.        Left basilar opacities suggestive of pneumonia.        Moderate compression fracture T12 vertebral body, age indeterminate   but new from 2013. Osteopenia.        Signed by: Nas Townsend 6/25/2024 12:53 PM   Dictation workstation:   [id number]      XR chest 2 views   Final Result   Mild vascular congestion without overt edema.   Signed by Tano Slade MD          Transthoracic Echo (TTE) Complete    Result Date: 6/26/2024   [facility], [address] [place] [address]               Tel [phone] and Fax [phone] TRANSTHORACIC ECHOCARDIOGRAM REPORT  Patient Name:      STEVE Fraire Physician:    [id number] Trevon Hernandez MD Study Date:        6/26/2024            Ordering Provider:    [id number] DAMI SMALL MRN/PID:           [id number]             Fellow: Accession#:        [id number]         Nurse: Date of Birth/Age: 1945 / 79 years Sonographer:          Carmen Jeter                                                               [facility] Gender:            F                    Additional Staff: Height:            147.32 cm            Admit Date:           6/24/2024 Weight:            69.85 kg             Admission Status:     Inpatient - Time                                                               Critical BSA / BMI:         1.63 m2 / 32.19      Encounter#:           [id number]                    kg/m2 Blood Pressure:    105/83 mmHg          Department Location:  [facility] Non                                                               Invasive Study Type:    TRANSTHORACIC ECHO (TTE) COMPLETE Diagnosis/ICD: Heart failure, unspecified-I50.9; Abnormal  electrocardiogram                [ECG] [EKG]-R94.31 Indication:    Abnormal EKG, Congestive Heart Failure CPT Code:      Echo Complete w Full Doppler-[id number] Patient History: Pertinent History: CKD, Nausea, Abdominal pain, Dyspnea, CHF and A-Fib. Study Detail: The following Echo studies were performed: 2D, M-Mode, Doppler and               color flow. Technically challenging study due to No Pedoff or               Definity due to pt. agitation at end of test. The patient was               awake.  PHYSICIAN INTERPRETATION: Left Ventricle: The left ventricular systolic function is severely decreased, with a visually estimated ejection fraction of 20-25%. There is global hypokinesis of the left ventricle with minor regional variations. The left ventricular cavity size is moderately dilated. There is mild concentric left ventricular hypertrophy. Findings are consistent with dilated cardiomyopathy. Spectral Doppler shows a restrictive pattern of left ventricular diastolic filling. There are elevated left atrial and left ventricular end diastolic pressures. Left Atrium: The left atrium is mild to moderately dilated. Right Ventricle: The right ventricle is normal in size. There is mildly reduced right ventricular systolic function. Right Atrium: The right atrium is mildly dilated. Aortic Valve: The aortic valve is trileaflet. There is mild aortic valve cusp calcification. There is evidence of mildly elevated transaortic gradients consistent with sclerosis of the aortic valve. The aortic valve dimensionless index is 0.52. There is trivial aortic valve regurgitation. The peak instantaneous gradient of the aortic valve is 10.7 mmHg. The mean gradient of the aortic valve is 6.4 mmHg. Mitral Valve: The mitral valve is normal in structure. There is severe mitral annular calcification. There is mild to moderate mitral valve regurgitation. Tricuspid Valve: The tricuspid valve is structurally normal. There is mild tricuspid  regurgitation. Pulmonic Valve: The pulmonic valve is structurally normal. There is physiologic pulmonic valve regurgitation. Pericardium: There is no pericardial effusion noted. Aorta: The aortic root is normal. Pulmonary Artery: The tricuspid regurgitant velocity is 3.51 m/s, and with an estimated right atrial pressure of 8 mmHg, the estimated pulmonary artery pressure is mild to moderately elevated with the RVSP at 57.3 mmHg. Systemic Veins: The inferior vena cava appears to be of normal size. There is poor inspiratory collapse of the IVC (less than 50%), consistent with elevated right atrial pressure.  CONCLUSIONS:  1. The left ventricular systolic function is severely decreased, with a visually estimated ejection fraction of 20-25%.  2. There is global hypokinesis of the left ventricle with minor regional variations.  3. Spectral Doppler shows a restrictive pattern of left ventricular diastolic filling.  4. There are elevated left atrial and left ventricular end diastolic pressures.  5. Left ventricular cavity size is moderately dilated.  6. There is dilated cardiomyopathy.  7. There is mildly reduced right ventricular systolic function.  8. The left atrium is mild to moderately dilated.  9. There is severe mitral annular calcification. 10. Mild to moderate mitral valve regurgitation. 11. Aortic valve sclerosis. 12. Mild to moderately elevated pulmonary artery pressure. Estimated PASP around 45-50 mm Hg, CVP is elevated. QUANTITATIVE DATA SUMMARY: 2D MEASUREMENTS:                           Normal Ranges: IVSd:          1.25 cm    (0.6-1.1cm) LVPWd:         1.04 cm    (0.6-1.1cm) LVIDd:         5.28 cm    (3.9-5.9cm) LVIDs:         3.66 cm LV Mass Index: 147.0 g/m2 LV % FS        30.6 % LA VOLUME:                               Normal Ranges: LA Vol A4C:        72.9 ml    (22+/-6mL/m2) LA Vol A2C:        72.4 ml LA Vol BP:         73.9 ml LA Vol Index A4C:  44.7 ml/m2 LA Vol Index A2C:  44.4 ml/m2 LA Vol Index BP:    45.3 ml/m2 LA Area A4C:       22.3 cm2 LA Area A2C:       22.6 cm2 LA Major Axis A4C: 5.8 cm LA Major Axis A2C: 6.0 cm LA Volume Index:   45.4 ml/m2 LA Vol A4C:        63.8 ml LA Vol A2C:        69.1 ml RA VOLUME BY A/L METHOD:                       Normal Ranges: RA Area A4C: 17.5 cm2 LV SYSTOLIC FUNCTION BY 2D PLANIMETRY (MOD):                      Normal Ranges: EF-A4C View:    22 % (>=55%) EF-A2C View:    37 % EF-Biplane:     34 % EF-Visual:      23 % LV EF Reported: 23 % LV DIASTOLIC FUNCTION:                     Normal Ranges: MV Peak E: 1.07 m/s (0.7-1.2 m/s) MITRAL VALVE:                      Normal Ranges: MV Vmax:    1.14 m/s (<=1.3m/s) MV peak P.2 mmHg (<5mmHg) MV mean P.6 mmHg (<2mmHg) MV VTI:     19.60 cm (10-13cm) MV DT:      168 msec (150-240msec) MITRAL INSUFFICIENCY:                         Normal Ranges: MR VTI:     136.87 cm MR Vmax:    436.59 cm/s MR Volume:  25.37 ml MR Flow Rt: 80.93 ml/s MR EROA:    0.19 cm2 AORTIC VALVE:                                    Normal Ranges: AoV Vmax:                1.64 m/s  (<=1.7m/s) AoV Peak PG:             10.7 mmHg (<20mmHg) AoV Mean P.4 mmHg  (1.7-11.5mmHg) LVOT Max Johnny:            0.69 m/s  (<=1.1m/s) AoV VTI:                 29.24 cm  (18-25cm) LVOT VTI:                15.29 cm LVOT Diameter:           1.97 cm   (1.8-2.4cm) AoV Area, VTI:           1.59 cm2  (2.5-5.5cm2) AoV Area,Vmax:           1.29 cm2  (2.5-4.5cm2) AoV Dimensionless Index: 0.52  RIGHT VENTRICLE: RV Basal 2.70 cm RV Mid   1.90 cm RV Major 7.4 cm TAPSE:   16.0 mm RV s'    0.06 m/s TRICUSPID VALVE/RVSP:                             Normal Ranges: Peak TR Velocity: 3.51 m/s RV Syst Pressure: 57.3 mmHg (< 30mmHg) IVC Diam:         1.80 cm PULMONIC VALVE:                      Normal Ranges: PV Max Johnny: 0.8 m/s  (0.6-0.9m/s) PV Max P.9 mmHg AORTA: Asc Ao Diam 2.73 cm  [id number] Trevon Hernandez MD Electronically signed on 2024 at 11:11:21 AM  ** Final **            Assessment/Plan   Principal Problem:    Acute on chronic congestive heart failure, unspecified heart failure type (Multi)      Echocardiogram from 2022, LVEF 50 to 55%, LVH with diastolic dysfunction, left atrial enlargement, moderate AAS, heavily calcified mitral valve annulus, mild mitral stenosis, mild to moderate MR/TR, moderate pulmonary hypertension     Acute on chronic systolic and diastolic CHF, cardiomyopathy possibly from dyssynchrony with LBBB  -BNP 2014  -CXR showed mild vascular congestion  -I reviewed the Echocardiogram as above  -Repeat echo as above showed LVEF down to 20-25%, elevated left atrial and ventricular end diastolic pressures  -Strict I & Os  -Daily weights  -2gm na diet  -1500mL fluid restriction  -Hold torsemide  -received a dose of lasix in ER  -Received Lasix IV last night became hypotensive, required levophed short term  -At risk for SCD, advise Lifevest prior to discharge  -Outpt referral to EP for BiVICD  -Outpt right and left heart cath  -Will start ACE/ARB and SGLT2i once BP and kidney function improve    2. Pneumonia  -Procal 0.09  -WBC elevated   -CT chest showed pulmonary edema and pneumonia  -antibiotics  -bronchodilators  -oxygen support  -sputum culture  -blood cultures     3. Paroxysmal atrial fibrillation  -Recently diagnosed  -On coumadin->INR 7.8->7.2->unable to calculate  -Hold coumadin  -Agree with Vitamin K  -Monitor INR  -No signs of bleeding  -Suggest restarting metoprolol at lower dose for rate control  -Monitor on tele     4. Elevated troponin-Non MI elevation 2/2 CHF, pneumonia  -No reports of chest pain  -EKG showed AF with chronic LBBB  -Troponin downtrending  -Echo showed LVEF down to 20-25%  -Plan for left heart cath as outpt     5. Acute kidney injury  -Creatinine up to 1.9->1.83  -Monitor     6. Hypertension  -stable  -2gm na diet  -cont meds  -monitor     7. Diabetes mellitus  -ISS  -Accuchecks       Critical care time 35 minutes    Marlene MCCOY  Raegan, APRN-CNP

## 2024-06-26 NOTE — PROGRESS NOTES
Physical Therapy                 Therapy Communication Note    Patient Name: Erick Muhammad  MRN: [id number]  Today's Date: 6/26/2024     Discipline: Physical Therapy    Missed Visit Reason: Patient placed on medical hold. PT consult received and chart reviewed. Pt now in ICU on Levophed and remains confused with bilateral soft wrist restraints to maintain safety. No PT eval this date; will re-attempt when pt is medically stable and able to appropriately participate.    Missed Time: Attempt

## 2024-06-27 VITALS
TEMPERATURE: 97.7 F | DIASTOLIC BLOOD PRESSURE: 71 MMHG | RESPIRATION RATE: 26 BRPM | HEIGHT: 56 IN | BODY MASS INDEX: 36.1 KG/M2 | WEIGHT: 160.5 LBS | OXYGEN SATURATION: 98 % | SYSTOLIC BLOOD PRESSURE: 103 MMHG | HEART RATE: 120 BPM

## 2024-06-27 LAB
BACTERIA UR CULT: NORMAL
EST. AVERAGE GLUCOSE BLD GHB EST-MCNC: 123 MG/DL
HBA1C MFR BLD: 5.9 %

## 2024-06-27 PROCEDURE — 94668 MNPJ CHEST WALL SBSQ: CPT

## 2024-06-27 PROCEDURE — 2500000004 HC RX 250 GENERAL PHARMACY W/ HCPCS (ALT 636 FOR OP/ED)

## 2024-06-27 PROCEDURE — 2500000002 HC RX 250 W HCPCS SELF ADMINISTERED DRUGS (ALT 637 FOR MEDICARE OP, ALT 636 FOR OP/ED)

## 2024-06-27 PROCEDURE — 94640 AIRWAY INHALATION TREATMENT: CPT

## 2024-06-27 PROCEDURE — 99239 HOSP IP/OBS DSCHRG MGMT >30: CPT

## 2024-06-27 PROCEDURE — 94760 N-INVAS EAR/PLS OXIMETRY 1: CPT

## 2024-06-27 PROCEDURE — 2500000004 HC RX 250 GENERAL PHARMACY W/ HCPCS (ALT 636 FOR OP/ED): Performed by: NURSE PRACTITIONER

## 2024-06-27 PROCEDURE — 2500000005 HC RX 250 GENERAL PHARMACY W/O HCPCS

## 2024-06-27 RX ORDER — IPRATROPIUM BROMIDE AND ALBUTEROL SULFATE 2.5; .5 MG/3ML; MG/3ML
3 SOLUTION RESPIRATORY (INHALATION)
Status: DISCONTINUED | OUTPATIENT
Start: 2024-06-27 | End: 2024-06-27 | Stop reason: HOSPADM

## 2024-06-27 ASSESSMENT — PAIN - FUNCTIONAL ASSESSMENT
PAIN_FUNCTIONAL_ASSESSMENT: PAINAD (PAIN ASSESSMENT IN ADVANCED DEMENTIA SCALE)
PAIN_FUNCTIONAL_ASSESSMENT: 0-10
PAIN_FUNCTIONAL_ASSESSMENT: PAINAD (PAIN ASSESSMENT IN ADVANCED DEMENTIA SCALE)

## 2024-06-27 ASSESSMENT — PAIN SCALES - PAIN ASSESSMENT IN ADVANCED DEMENTIA (PAINAD)
TOTALSCORE: 0
TOTALSCORE: MEDICATION (SEE MAR)
NEGVOCALIZATION: REPEATED TROUBLED CALLING OUT, LOUD MOANING/GROANING, CRYING
FACIALEXPRESSION: SMILING OR INEXPRESSIVE
BREATHING: NORMAL
CONSOLABILITY: UNABLE TO CONSOLE, DISTRACT OR REASSURE
CONSOLABILITY: UNABLE TO CONSOLE, DISTRACT OR REASSURE
BREATHING: NOISY LABORED BREATHING, LONG PERIODS OF HYPERVENTILATION, CHEYNE-STOKES RESPIRATIONS
FACIALEXPRESSION: FACIAL GRIMACING
TOTALSCORE: 9
BREATHING: NOISY LABORED BREATHING, LONG PERIODS OF HYPERVENTILATION, CHEYNE-STOKES RESPIRATIONS
NEGVOCALIZATION: REPEATED TROUBLED CALLING OUT, LOUD MOANING/GROANING, CRYING
BREATHING: NORMAL
CONSOLABILITY: NO NEED TO CONSOLE
TOTALSCORE: 0
TOTALSCORE: MEDICATION (SEE MAR)
FACIALEXPRESSION: SMILING OR INEXPRESSIVE
BODYLANGUAGE: RELAXED
TOTALSCORE: 9
BODYLANGUAGE: TENSE, DISTRESSED PACING, FIDGETING
FACIALEXPRESSION: FACIAL GRIMACING
CONSOLABILITY: NO NEED TO CONSOLE
BODYLANGUAGE: RELAXED
BODYLANGUAGE: TENSE, DISTRESSED PACING, FIDGETING

## 2024-06-27 ASSESSMENT — PAIN DESCRIPTION - LOCATION: LOCATION: GENERALIZED

## 2024-06-27 ASSESSMENT — PAIN SCALES - WONG BAKER: WONGBAKER_NUMERICALRESPONSE: HURTS EVEN MORE

## 2024-06-27 ASSESSMENT — PAIN SCALES - GENERAL
PAINLEVEL_OUTOF10: 0 - NO PAIN
PAINLEVEL_OUTOF10: 6
PAINLEVEL_OUTOF10: 0 - NO PAIN

## 2024-06-27 NOTE — DISCHARGE SUMMARY
Discharge Diagnosis  Acute on chronic congestive heart failure, unspecified heart failure type (Multi)    Issues Requiring Follow-Up  none    Discharge Meds     Your medication list        CONTINUE taking these medications        Instructions Last Dose Given Next Dose Due   [email or web address],thigh,reg,med misc      Use daily for leg swelling              ASK your doctor about these medications        Instructions Last Dose Given Next Dose Due   allopurinol 100 mg tablet  Commonly known as: Zyloprim      Take 2 tablets (200 mg) by mouth once daily.       ASPIRIN ORAL           cephalexin 250 mg capsule  Commonly known as: Keflex      Take 1 capsule by mouth once daily       levothyroxine 112 mcg tablet  Commonly known as: Synthroid, Levoxyl      TAKE 1 TABLET BY MOUTH ONCE DAILY ON AN EMPTY STOMACH       losartan 25 mg tablet  Commonly known as: Cozaar      Take 1 tablet by mouth once daily       metoprolol succinate XL 50 mg 24 hr tablet  Commonly known as: Toprol-XL           Mitigare 0.6 mg capsule capsule  Generic drug: colchicine      Take 1 capsule (0.6 mg) by mouth once daily.       omeprazole 40 mg DR capsule  Commonly known as: PriLOSEC           oxybutynin XL 15 mg 24 hr tablet  Commonly known as: Ditropan-XL      Take 1 tablet by mouth once daily       torsemide 20 mg tablet  Commonly known as: Demadex      TAKE 3 TABLETS BY MOUTH TWICE DAILY       upadacitinib ER 15 mg tablet extended release 24 hr  Commonly known as: Rinvoq      TAKE ONE TABLET BY MOUTH ONE TIME DAILY       Vitamin D3 25 MCG (1000 UT) capsule  Generic drug: cholecalciferol           warfarin 5 mg tablet  Commonly known as: Coumadin                    Test Results Pending At Discharge  Pending Labs       Order Current Status    Blood Culture Preliminary result    Blood Culture Preliminary result    Blood Culture Preliminary result            Hospital Course  Brief HPI:  Erick Muhammad is a 79 y.o. year old female  patient with past medical  history significant for CKD (baseline GFR 41 to 45%, baseline creatinine 1.21 to 1.26), hypothyroidism, chronic anemia, HFpEF (EF 55-60% from 01/20/2024), rheumatoid arthritis, gout, LUCY (does not use CPAP), prediabetes (last HbA1c from 10/31/2023 at 5.7), vitamin D deficiency, recurrent UTIs (on daily cephalexin prophylaxis) was admitted on account of chest pain, shortness of breath, nausea and abdominal pain     Hospital Course:  In ED, patient was found to have no leukocytosis.  Her BNP was elevated at 2014.  Her troponins were initially elevated at 2 and subsequently down trended to 67.  Lactate was normal at 1.4.  COVID-19 PCR, influenza AMB were negative. chest x-ray was ordered which ordered which showed mild vascular congestion without overt edema.  Patient was admitted for acute heart failure congestion cardiology was consulted.  On subsequent day patient was seen to have worsening mentation.  Patient was pan scanned.  CT head was negative.  CT chest abdomen and pelvis  was positive for findings concerning for pneumonia.  ICU course: Pt was transferred on 6/25/24  near about 2144 for increased oxygen demand and hypotension suspecting septic versus cardiogenic shock and was started on Levophed.  After coming to the ICU Levophed was stopped after 2 hours, 1 L LR bolus was also given, patient was mentally altered.  Broad-spectrum antibiotic was started broad-spectrum antibiotic was started Zosyn).  Later patient was transitioned to 3 L via nasal cannula oxygen saturating 100%,.  But she remained hypotensive and tachycardic. Echo showed EF 20-25%, with gloal hypokinesis. According to cardiology, she was started on 250 mL LR over 4 hours, her metoprolol 50 mg twice daily changed to 25 mg daily for sustained tachycardia, can be given if SBP comes to 100.  Palliative was consulted.  After long discussion, patient's daughter who is also the POA, decided to change the CODE STATUS from DNR/DNI to DNRCC and requested  hospice consult.  Possible hospice meeting tomorrow .  We we will still continue the IV antibiotic until further decision is made.  Hospice meeting was held near about 1300 on 6/27/2024 and decision was made to transfer the patient to [facility] facility.  Patient is discharged to [facility].    Pertinent Physical Exam At Time of Discharge  Physical Exam  Constitutional:       Comments: sleepy   Cardiovascular:      Rate and Rhythm: Tachycardia present. Rhythm irregular.      Heart sounds: Normal heart sounds. No murmur heard.     No friction rub. No gallop.   Pulmonary:      Effort: Pulmonary effort is normal.      Breath sounds: Normal breath sounds. No wheezing, rhonchi or rales.   Abdominal:      Palpations: Abdomen is soft.         Outpatient Follow-Up  Future Appointments   Date Time Provider Department Center   7/15/2024  2:30 PM DO [id number] Jeff Montes MD

## 2024-06-27 NOTE — PROGRESS NOTES
06/27/24 1400   Discharge Planning   Home or Post Acute Services Other (Comment)  (Hospice)   Type of Post Acute Facility Services Other (Comment)  (Hospice house)   Type of Home Care Services Hospice   Patient expects to be discharged to: Informed by [facility] RN that patient will transfers to hospice house today at 7 PM. Family was made aware by [facility] RN.   Does the patient need discharge transport arranged? Yes   RoundTrip coordination needed? Yes   Has discharge transport been arranged? Yes   What day is the transport expected? 06/27/24   What time is the transport expected? 1900

## 2024-06-27 NOTE — CARE PLAN
Palliative Care Social Work Note    Pt referred to [facility] via careport.  See palliative care NP note for details.      [facility] meeting today 1300/1330.  Team updated.

## 2024-06-27 NOTE — NURSING NOTE
RN Hospice Note    Erick Muhammad is a Hospice Patient.   Hospice terminal diagnosis: chf  Physician: Dr concepcion, Dr Melvin. Dr Montes, np audrey coley   Visit type: discharge     Comments/recommendations: met with pt and dtr.  Both agreed to hospice and to go to [facility] today for pain and dyspnea. [facility] faxed, report called and [facility] set for 7pm tonight.  Keep garvin, heplock and oxygen .   Hospice will not start until she is discharged . Thank you for consult     Discharge Planning:  Patient to be discharged to  [facility]     The following is to be completed:  Discharge order: done  State DNR signed by MD: done  Nursing facility referral/transfer form: na  Medication reconciliation: done  PAS/RR or convalescent stay form: na  Prescriptions for al narcotics/new medications: na  Transportation: [facility] 7pm   Other: thank you for consult     Plan of care reviewed with patient/family members grzegorz dozier   Plan of care reviewed with hospital staff members: jose arrington sw and césar mishra cm      Please notify [facility] of any changes in condition. Thank you.  Office: [phone] (8 am-6:30 pm M-F and 8 am-4:30 pm weekends and holidays)   [phone] (6:30 pm-8 am M-F and 4:30 pm-8 am weekends and holidays)    Lizett Treadwell RN

## 2024-06-27 NOTE — CARE PLAN
The patient's goals for the shift include      The clinical goals for the shift include Patient will remain comfortable throughout shift.

## 2024-06-27 NOTE — CARE PLAN
The patient's goals for the shift include      The clinical goals for the shift include Patient will remain comfortable throughout shift.    Over the shift, the patient did not make progress toward the following goals. Barriers to progression include,increased confusion. Recommendations to address these barriers include patient monitoring.

## 2024-06-28 LAB
Q ONSET: 209 MS
QRS COUNT: 15 BEATS
QRS DURATION: 148 MS
QT INTERVAL: 408 MS
QTC CALCULATION(BAZETT): 501 MS
QTC FREDERICIA: 469 MS
R AXIS: 38 DEGREES
T AXIS: 154 DEGREES
T OFFSET: 413 MS
VENTRICULAR RATE: 91 BPM

## 2024-06-29 LAB — BACTERIA BLD CULT: NORMAL

## 2024-06-30 LAB
BACTERIA BLD CULT: NORMAL
BACTERIA BLD CULT: NORMAL

## 2024-07-15 ENCOUNTER — APPOINTMENT (OUTPATIENT)
Dept: PRIMARY CARE | Facility: CLINIC | Age: 79
End: 2024-07-15
Payer: COMMERCIAL

## 2024-07-30 DIAGNOSIS — M06.9 RHEUMATOID ARTHRITIS, INVOLVING UNSPECIFIED SITE, UNSPECIFIED WHETHER RHEUMATOID FACTOR PRESENT (MULTI): ICD-10-CM

## 2024-08-07 LAB
ATRIAL RATE: 101 BPM
ATRIAL RATE: 105 BPM
ATRIAL RATE: 115 BPM
Q ONSET: 213 MS
Q ONSET: 213 MS
Q ONSET: 214 MS
QRS COUNT: 15 BEATS
QRS COUNT: 16 BEATS
QRS COUNT: 17 BEATS
QRS DURATION: 142 MS
QRS DURATION: 142 MS
QRS DURATION: 144 MS
QT INTERVAL: 380 MS
QT INTERVAL: 384 MS
QT INTERVAL: 386 MS
QTC CALCULATION(BAZETT): 479 MS
QTC CALCULATION(BAZETT): 480 MS
QTC CALCULATION(BAZETT): 507 MS
QTC FREDERICIA: 444 MS
QTC FREDERICIA: 447 MS
QTC FREDERICIA: 462 MS
R AXIS: -30 DEGREES
R AXIS: 77 DEGREES
R AXIS: 84 DEGREES
T AXIS: 158 DEGREES
T AXIS: 260 DEGREES
T AXIS: 262 DEGREES
T OFFSET: 404 MS
T OFFSET: 405 MS
T OFFSET: 406 MS
VENTRICULAR RATE: 105 BPM
VENTRICULAR RATE: 93 BPM
VENTRICULAR RATE: 96 BPM